# Patient Record
Sex: FEMALE | Race: OTHER
[De-identification: names, ages, dates, MRNs, and addresses within clinical notes are randomized per-mention and may not be internally consistent; named-entity substitution may affect disease eponyms.]

---

## 2017-04-06 NOTE — ER DOCUMENT REPORT
ED General





- General


Chief Complaint: OB Problem (<20wks)


Stated Complaint: BELIEVE WATER HAS BROKEN,UNSURE OF HOW FAR ALONG


Mode of Arrival: Ambulatory


Information source: Patient


Notes: 


21 yr old female  who is pregnant but unsure of how far along presents with 

ocncerns that her water broke. pt denies any vaginal bleeding. denies any pain 

at all. notes clear water drenching her bed and her clothes 


TRAVEL OUTSIDE OF THE [place] IN LAST 30 DAYS: No





- HPI


Onset: Just prior to arrival


Onset/Duration: Sudden


Quality of pain: No pain


Severity: Moderate


Pain Level: Denies


Associated symptoms: Other


Exacerbated by: Denies


Relieved by: Denies


Similar symptoms previously: No


Recently seen / treated by doctor: No





- Related Data


Allergies/Adverse Reactions: 


 





No Known Allergies Allergy (Verified 17 06:46)


 











Past Medical History





- Social History


Smoking Status: Never Smoker


Cigarette use (# per day): No


Chew tobacco use (# tins/day): No


Smoking Education Provided: No


Family History: CAD, DM, Hyperlipidemia, Hypertension, Thyroid Disfunction


Patient has suicidal ideation: No


Patient has homicidal ideation: No


Renal/ Medical History: Denies: Hx Peritoneal Dialysis





- Immunizations


Immunizations up to date: Yes


Hx Diphtheria, Pertussis, Tetanus Vaccination: Yes





Review of Systems





- Review of Systems


Notes: 


REVIEW OF SYSTEMS:


CONSTITUTIONAL :  Denies fever,  chills, or sweats.  Denies recent illness.


EENT:   Denies eye, ear, throat, or mouth pain or symptoms.  Denies nasal or 

sinus congestion or discharge.  Denies throat, tongue, or mouth swelling or 

difficulty swallowing.


CARDIOVASCULAR:  Denies chest pain.  Denies palpitations or racing or irregular 

heart beat.  Denies ankle edema.


RESPIRATORY:  Denies cough, cold, or chest congestion.  Denies shortness of 

breath, difficulty breathing, or wheezing.


GASTROINTESTINAL:  Denies abdominal pain or distention.  Denies nausea, vomiting

, or diarrhea.  Denies blood in vomitus, stools, or per rectum.  Denies black, 

tarry stools.  Denies constipation. 


GENITOURINARY:  Denies difficulty urinating, painful urination, burning, 

frequency, blood in urine, or discharge.


FEMALE  GENITOURINARY: Admits to clear vaginal discharge


MUSCULOSKELETAL:  Denies back or neck pain or stiffness.  Denies joint pain or 

swelling.


SKIN:   Denies rash, lesions or sores.


HEMATOLOGIC :   Denies easy bruising or bleeding.


LYMPHATIC:  Denies swollen, enlarged glands.


NEUROLOGICAL:  Denies confusion or altered mental status.  Denies passing out 

or loss of consciousness.  Denies dizziness or lightheadedness.  Denies 

headache.  Denies weakness or paralysis or loss of use of either side.  Denies 

problems with gait or speech.  Denies sensory loss, numbness, or tingling.  

Denies seizures.


PSYCHIATRIC:  Denies anxiety or stress.  Denies depression, suicidal ideation, 

or homicidal ideation.





ALL OTHER SYSTEMS REVIEWED AND NEGATIVE.








Dictation was performed using Dragon voice recognition software 








PHYSICAL EXAMINATION:





GENERAL: Well-appearing, well-nourished and in no acute distress.





HEAD: Atraumatic, normocephalic.





EYES: Pupils equal round and reactive to light, extraocular movements intact, 

conjunctiva are normal.





ENT: Nares patent, oropharynx clear without exudates.  Moist mucous membranes.





NECK: Normal range of motion, supple without lymphadenopathy





LUNGS: Breath sounds clear to auscultation bilaterally and equal.  No wheezes 

rales or rhonchi.





HEART: Regular rate and rhythm without murmurs





ABDOMEN: Gravid abdomen





Female : Bed and is drenched with clear liquid





Musculoskeletal: Normal range of motion, no pitting or edema.  No cyanosis.





NEUROLOGICAL: Cranial nerves grossly intact.  Normal speech, normal gait.  

Normal sensory, motor exams





PSYCH: Normal mood, normal affect.





SKIN: Warm, Dry, normal turgor, no rashes or lesions noted.





Physical Exam





- Vital signs


Vitals: 


 











Temp Pulse Resp BP Pulse Ox


 


 98.1 F   95   20   111/45 L  98 


 


 17 06:47  17 06:47  17 06:47  17 06:47  17 06:47














Course





- Re-evaluation


Re-evalutation: 





17 08:02


Patient immediately sent for ultrasound to evaluate pregnancy





- Vital Signs


Vital signs: 


 











Temp Pulse Resp BP Pulse Ox


 


 98.1 F   95   20   111/45 L  98 


 


 17 06:47  17 06:47  17 06:47  17 06:47  17 06:47














Discharge





- Discharge


Clinical Impression: 


 Pregnancy with 33 completed weeks gestation





Limited prenatal care


Qualifiers:


 Trimester: third trimester Qualified Code(s): O09.33 - Supervision of 

pregnancy with insufficient  care, third trimester





Condition: Stable


Disposition: LABOR CHECK


Additional Instructions: 


Your water has broken, you will be sent to Labor and Delivery for evaluation

## 2017-04-08 NOTE — L&D PROGRESS NOTES
=================================================================

PROGRESS NOTES

=================================================================

Datetime Report Generated by CPN: 04/08/2017 14:27

   

   

=================================================================

PROGRESS NOTE

=================================================================

   

Impression:  Premature Rupture of Membranes

Plan:  Continue Present Management

Informed Consent Obtained:  Vaginal Delivery; Risks, Benefits and

   Alternatives Discussed

Vital Signs :  Reviewed; Within Normal Limits

Comment:  CBC normal and patient w/o c/o.  Abd benign.  Plan for

   continue latency abx and expectant management until Monday.  BMZ

   complete.  Deliver for maternal/fetal indications.  Rare variables

   and reassuring FWB at this lisa.  No ctx.  Continue to monitor

   

=================================================================

MEMBRANES

=================================================================

   

Membranes:  Ruptured

Amniotic Fluid Color:  Clear

   

=================================================================

FETUS A

=================================================================

   

FHR - Baseline:  120

Monitoring:  External US

Variability:  Moderate 6-25bpm

Decelerations:  Variable

FHR Category:  Category II

:  33.3

Fetal Presentation:  Vertex

   

=================================================================

SIGNATURE

=================================================================

   

SIGNATURE:  [id number]

Signature:  Electronically signed by MD JANIE Pino) on

   4/8/2017 at 14:27  with User ID: [email or web address]

## 2017-04-10 NOTE — L&D PROGRESS NOTES
=================================================================

PROGRESS NOTES

=================================================================

Datetime Report Generated by CPN: 04/10/2017 11:27

   

   

=================================================================

PROGRESS NOTE

=================================================================

   

Impression:  Normal Progression of Labor; Reassuring Fetal Heart Rate

Procedures:  Sterile Vag Exam

Plan:  Continue Present Management; Induction

Informed Consent Obtained:  Vaginal Delivery

Vital Signs :  Reviewed

Comment:  Pt uncomfortable, would like epidural

   SVE as above

   Pt may have epdiural

   anticiapte 

   

=================================================================

VAGINAL EXAM

=================================================================

   

Dilatation:  3

Effacement:  80

Station:  -3

Contractions:  2-3

   

=================================================================

MEMBRANES

=================================================================

   

Membranes:  Ruptured

Amniotic Fluid Color:  Clear

   

=================================================================

FETUS A

=================================================================

   

FHR - Baseline:  145

Monitoring:  External US

Variability:  Moderate 6-25bpm

Accelerations:  15X15

Decelerations:  None

FHR Category:  Category I

   

=================================================================

FETUS C

=================================================================

   

SIGNATURE:  [id number]

Assignment:  Mercedes Wilson MD

Signature:  Electronically signed by Justine Martin CNM on 4/10/2017 at

   11:26  with User ID: [email or web address]

:  Electronically signed by Justine Martin CNM on 4/10/2017 at 11:26 

   with User ID: [email or web address]

## 2017-04-10 NOTE — ADMISSION PHYSICAL
=================================================================



=================================================================

Datetime Report Generated by CPN: 04/10/2017 15:11

   

   

=================================================================

CURRENT ADMISSION

=================================================================

   

Prenatal Hx Assessment:  No Prenatal Care

Chief Complaint:  Suspected Ruptured Membranes

Indication for Induction:  Not Applicable

Admit Plan:  Admit to Unit; Initiate  Labor Protocol

   

=================================================================

ALLERGIES

=================================================================

   

Medication Allergies:  No

Medication Allergies:  No Known Allergies (2017)

Medication Allergies:  No Known Allergies (2017)

Latex:  No Latex Allergies

   

=================================================================

OBSTETRICAL HISTORY

=================================================================

   

EDC:  2017 00:00

:  4

Para:  3

Gestational Diabetes:  No

Rh Sensitization:  No

Incompetent Cervix:  No

PB:  No

Infertility:  No

ART Treatment:  No

Uterine Anomaly:  No

IUGR:  No

Hx Previous C/S:  No

Macrosomia:  No

Hx Loss/Stillborn:  No

PIH:  No

Hx  Death:  No

Placenta Previa/Abruption:  No

Depression/PP Depression:  No

PTL/PROM:  Yes

Post Partum Hemorrhage:  No

Current Pregnancy Procedures:  None

Obstetrical History Comments:  G1- ,Baby boy, 6 lb 9 oz, 

   G2- ,Baby girl, 7lb 10 oz, 

   G3-  Baby girl,8lbs, , received cytotec 600mcg MS

   G4- current pregnancy, no prenatal care, PPPROM

   

=================================================================

***SEE PRENATAL RECORDS***

=================================================================

   

Alcohol:  No

Marijuana :  No

Cocaine:  No

Other Illicit Drugs:  No

Cigarettes:  Never Smoker. [id number]

   

=================================================================

MEDICAL HISTORY

=================================================================

   

Diabetes:  No

Blood Transfusion:  No

Pulmonary Disease (Asthma, TB):  No

Breast Disease:  No

Hypertension:  No

Gyn Surgery:  No

Heart Disease:  No

Hosp/Surgery:  Yes

Autoimmune Disorder:  No

Anesthetic Complications:  No

Kidney Disease:  No

Abnormal Pap Smear:  No

Neuro/Epilepsy:  No

Psychiatric Disorders:  No

Other Medical Diseases:  No

Hepatitis/Liver Disease:  No

Significant Family History:  No

Varicosities/Phlebitis:  No

Trauma/Violence :  No

Thyroid Dysfunction:  No

Medical History Comments:  childbirth x3, closed spaced pregnancies 

   

=================================================================

INFECTIOUS HISTORY

=================================================================

   

Gonorrhea:  No

Genital Herpes:  No

Chlamydia:  Yes

Tuberculosis:  No

Syphilis:  No

Hepatitis:  No

HIV/AIDS Exposure:  No

Rash or Viral Illness:  No

HPV:  No

Infectious History Comments:  chlamydia 

   

=================================================================

PHYSICAL EXAM

=================================================================

   

General:  Normal

HEENT:  Normal

Neurologic:  Normal

Thyroid:  Deferred

Heart:  Normal

Lungs:  Normal

Breast:  Normal

Back:  Normal

Abdomen:  Normal

Genitourinary Exam:  Normal

Extremities:  Normal

DTRs:  Normal

Pelvic Type:  Adequate

Physical Exam Comments:  cervical exam deferred at this time 

Vital Signs:  Reviewed; Within Normal Limits

   

=================================================================

VAGINAL EXAM

=================================================================

   

Dilatation:  3

Effacement:  80

Station:  -3

Contraction Comments:  2-3

Contraction Comments:  2-4

   

=================================================================

MEMBRANES

=================================================================

   

Membranes:  Ruptured

Membranes:  Ruptured

Amniotic Fluid Color:  Clear

Amniotic Fluid Color:  Clear

   

=================================================================

FETUS A

=================================================================

   

Monitoring:  External US

FHR- Baseline:  125

Variability:  Moderate 6-25bpm

Accelerations:  15X15

Decelerations:  None

FHR Category:  Category I

Fetal Presentation:  Vertex

Admit Comment:  19yo  O positive with no prenatal care this

   pregnancy presented to ER this am with suspected PROM. Evaluated in

   the ER and found to be 33w3d by u/s. EFW 4lbs 7oz in vertex

   presentation, anterior placenta and ADALBERTO 1.5cm. Hx of chlamydia in

    with first pregnancy. Pt. reports she found out she was

   pregnant in January but had not seeked prenatal care because she is

   moving to [place]. Reports large amount of clear fluid at 0630 this

   am. Pelvis proven to 7lbs 9oz. Hx of all vaginal deliveries at term

   no complications. Denies hx or current use of alcohol or drugs

   during or prior to finding out about pregnancy. Plan is to obtain

   prenatal care labs, GC/Chlam _ GBS cultures, first dose of

   betamethasone after blood draw, repeat in 24hrs if still pregnant.

   Ampicillin 2g q6h, 500mg zithromax qd, observation on L_D at this

   time and will try to get two doses of steroids in prior to IOL.

   Reassessed as clinically indicated. Considering adoption at this

   time. Dr. Agustin aware and in agreement with poc.

   

=================================================================

PLANS FOR LABOR AND DELIVERY

=================================================================

   

Labor and Delivery:  None

Pain Management:  Epidural

Feeding Preference:  Formula

Benefit of Breast Feed Discussed:  Yes

Circumcision:  unknown sex 

   

=================================================================

INFORMED CONSENT

=================================================================

   

Informed Consent Obtained:  Vaginal Delivery

Informed Consent Obtained:  Vaginal Delivery

Informed Consent Obtained:  Vaginal Delivery; Risks, Benefits and

   Alternatives Discussed

Assignment:  Danisha Agustin MD

Signature:  Electronically signed by Lucie Tirado CNM on 2017

   at 09:16  with User ID: [email or web address]

:  Electronically signed by Lucie Tirado CNM on 2017 at 09:16

   with User ID: [email or web address]

## 2017-04-10 NOTE — L&D PROGRESS NOTES
=================================================================

PROGRESS NOTES

=================================================================

Datetime Report Generated by CPN: 04/10/2017 09:49

   

   

=================================================================

PROGRESS NOTE

=================================================================

   

Impression:  Reassuring Fetal Heart Rate

Procedures:  Sterile Vag Exam

Procedures- Other:  per rn

Plan:  Induction

Informed Consent Obtained:  Vaginal Delivery

Vital Signs :  Reviewed

Comment:  Continues to leak clear fluid, states active baby, denies

   bleeding, fever, chills, or abdominal pain. 

   34 weeks today

   IOL, ppprom on 04/06/17 @ 0600, clear

   has been on azithromycin and ampicillin

   GBS negative

   Pt desires depo before discharge. 

   

=================================================================

VAGINAL EXAM

=================================================================

   

Contractions:  2-4

   

=================================================================

FETUS A

=================================================================

   

FHR - Baseline:  140

Monitoring:  External US

Variability:  Moderate 6-25bpm

Accelerations:  15X15

Decelerations:  None

FHR Category:  Category I

   

=================================================================

FETUS C

=================================================================

   

SIGNATURE:  [id number]

Assignment:  Mercedes Wilson MD

Signature:  Electronically signed by Justine Martin CNM on 4/10/2017 at

   09:48  with User ID: [email or web address]

:  Electronically signed by Justine Martin CNM on 4/10/2017 at 09:48 

   with User ID: [email or web address]

## 2017-04-10 NOTE — L&D DISCHARGE SUMMARY
=================================================================

OB Discharge Summary

=================================================================

Datetime Report Generated by CPN: 04/10/2017 18:10

   

   

=================================================================

DISCHARGE DIAGNOSIS

=================================================================

   

Gestation:  34.0

Number of Babies in Womb:  1

Parity:  3

## 2017-04-11 NOTE — PDOC PROGRESS REPORT
Subjective-OB


Subjective: 


Post Delivery Day:








21 year old.  Denies any needs at this time 


Doing well, no c/o, wants to go home, baby in NICU, scant lochia, eating well





Physical Exam (OB)


Vital Signs: 


 











Temp Pulse Resp BP Pulse Ox


 


 98.8 F   104 H  18   115/64   100 


 


 04/10/17 19:30  04/10/17 19:30  04/10/17 19:30  04/10/17 19:30  04/10/17 15:13














- PIH/Pre-Eclampsia


Clonus: Negative





- Lochia


Lochia Amount: Small 10-25 ml


Lochia Color: Rubra/Red





- Abdomen


Description: Soft


Hernia Present: No


Fundal Description: Firm, Midline


Fundal Height: u/u - u/2





Objective-Diagnostic


Laboratory: 


 





 17 06:45 





 











  17





  06:45


 


WBC  8.0


 


RBC  4.50


 


Hgb  10.8 L


 


Hct  33.5 L


 


MCV  75 L


 


MCH  24.1 L


 


MCHC  32.4


 


RDW  15.7 H


 


Plt Count  282














Assessment and Plan(PN)





- Assessment and Plan


(1) Delivery normal


Is this a current diagnosis for this admission?: Yes





(2) Anemia


Qualifiers: 


     Anemia type: iron deficiency


Is this a current diagnosis for this admission?: Yes





(3) Limited prenatal care


Qualifiers: 


     Trimester: unspecified trimester        Qualified Code(s): O09.30 - 

Supervision of pregnancy with insufficient  care, unspecified 

trimester  


Is this a current diagnosis for this admission?: Yes





(4) Pregnancy with 33 completed weeks gestation


Is this a current diagnosis for this admission?: Yes








- Time Spent with Patient


Time with patient: Less than 15 minutes


Smoking Education Provided: Over 3 minutes


Medications reviewed and adjusted accordingly: Yes





- Disposition


Anticipated Discharge: Home


Within: within 24 hours

## 2017-04-12 NOTE — L&D ADMISSION ASSESSMENT
=================================================================

LD ADM ASMT

=================================================================

Datetime Report Generated by CPN: 04/12/2017 20:20

   

Weight (lb):  165    (04/12/2017 10:46:QS system process)

Weight (lb):  165    (04/12/2017 10:28:QS system process)

Weight (lb):  165    (04/11/2017 14:36:QS system process)

Weight (lb):  165    (04/10/2017 15:26:QS system process)

Weight (lb):  165    (04/10/2017 15:10:QS system process)

Weight (lb):  165    (04/10/2017 10:42:QS system process)

Weight (lb):  165    (04/10/2017 09:15:QS system process)

Weight (lb):  165    (04/08/2017 05:48:QS system process)

Weight (lb):  165    (04/07/2017 09:50:QS system process)

Weight (kg):  75.0    (04/12/2017 10:46:QS system process)

Weight (kg):  75.0    (04/12/2017 10:28:QS system process)

Weight (kg):  75.0    (04/11/2017 14:36:QS system process)

Weight (kg):  75.0    (04/10/2017 15:26:QS system process)

Weight (kg):  75.0    (04/10/2017 15:10:QS system process)

Weight (kg):  75.0    (04/10/2017 10:42:QS system process)

Weight (kg):  75.0    (04/10/2017 09:15:QS system process)

Weight (kg):  75.0    (04/08/2017 05:48:QS system process)

Weight (kg):  75.0    (04/07/2017 09:50:QS system process)

BMI:  33.3    (04/12/2017 10:46:QS system process)

BMI:  33.3    (04/12/2017 10:28:QS system process)

BMI:  33.3    (04/11/2017 14:36:QS system process)

BMI:  33.3    (04/10/2017 15:26:QS system process)

BMI:  33.3    (04/10/2017 15:10:QS system process)

BMI:  33.3    (04/10/2017 10:42:QS system process)

BMI:  33.3    (04/10/2017 09:15:QS system process)

BMI:  33.3    (04/08/2017 05:48:QS system process)

BMI:  33.3    (04/07/2017 09:50:QS system process)

Total Toribio's Score:  5    (04/10/2017 08:14:QS system process)

Toribio's Score Text:  5-8 = Small percentage of induction failure   

   (04/10/2017 08:14:QS system process)

DVT Risk Total:  0    (04/10/2017 07:24:QS system process)

DVT Risk Total:  1    (04/09/2017 09:45:QS system process)

DVT Risk Total:  2    (04/08/2017 19:35:QS system process)

DVT Risk Total:  1    (04/08/2017 10:00:QS system process)

DVT Risk Total:  2    (04/07/2017 20:00:QS system process)

DVT Risk Total:  1    (04/07/2017 07:59:QS system process)

DVT Risk Text:  Low Risk (<10%) No specific measures, early ambulation 

   (04/10/2017 07:24:QS system process)

DVT Risk Text:  Low Risk (<10%) No specific measures, early ambulation 

   (04/09/2017 09:45:QS system process)

DVT Risk Text:  Moderate Risk (10-20%) - Consider stockings,

   compresssion device, pharmacological therapy per hospital policy   

   (04/08/2017 19:35:QS system process)

DVT Risk Text:  Low Risk (<10%) No specific measures, early ambulation 

   (04/08/2017 10:00:QS system process)

DVT Risk Text:  Moderate Risk (10-20%) - Consider stockings,

   compresssion device, pharmacological therapy per hospital policy   

   (04/07/2017 20:00:QS system process)

DVT Risk Text:  Low Risk (<10%) No specific measures, early ambulation 

   (04/07/2017 07:59:QS system process)

Viktor Scale Total:  23    (04/10/2017 07:24:QS system process)

Viktor Scale Total:  23    (04/09/2017 19:28:QS system process)

Viktor Scale Total:  21    (04/09/2017 09:45:QS system process)

Viktor Scale Total:  22    (04/08/2017 19:35:QS system process)

Viktor Scale Total:  22    (04/08/2017 10:00:QS system process)

Viktor Scale Total:  23    (04/07/2017 20:00:QS system process)

Viktor Scale Total:  21    (04/07/2017 07:59:QS system process)

Viktor Scale Risk:  No Risk of Pressure Ulcer Noted at this Time   

   (04/10/2017 07:24:QS system process)

Viktor Scale Risk:  No Risk of Pressure Ulcer Noted at this Time   

   (04/09/2017 19:28:QS system process)

Viktor Scale Risk:  No Risk of Pressure Ulcer Noted at this Time   

   (04/09/2017 09:45:QS system process)

Viktor Scale Risk:  No Risk of Pressure Ulcer Noted at this Time   

   (04/08/2017 19:35:QS system process)

Viktor Scale Risk:  No Risk of Pressure Ulcer Noted at this Time   

   (04/08/2017 10:00:QS system process)

Viktor Scale Risk:  No Risk of Pressure Ulcer Noted at this Time   

   (04/07/2017 20:00:QS system process)

Viktor Scale Risk:  No Risk of Pressure Ulcer Noted at this Time   

   (04/07/2017 07:59:QS system process)

Fall Risk Score:  20    (04/10/2017 07:24:QS system process)

Fall Risk Score:  0    (04/09/2017 19:28:QS system process)

Fall Risk Score:  20    (04/09/2017 09:45:QS system process)

Fall Risk Score:  0    (04/08/2017 19:35:QS system process)

Fall Risk Score:  20    (04/07/2017 20:00:QS system process)

Fall Risk Score:  0    (04/07/2017 07:59:QS system process)

Fall Risk Score Definition:  No Risk: No action required    (04/10/2017

   07:24:QS system process)

Fall Risk Score Definition:  No Risk: No action required    (04/09/2017

   19:28:QS system process)

Fall Risk Score Definition:  No Risk: No action required    (04/09/2017

   09:45:QS system process)

Fall Risk Score Definition:  No Risk: No action required    (04/08/2017

   19:35:QS system process)

Fall Risk Score Definition:  No Risk: No action required    (04/07/2017

   20:00:QS system process)

Fall Risk Score Definition:  No Risk: No action required    (04/07/2017

   07:59:QS system process)

## 2017-04-12 NOTE — DELIVERY SUMMARY
=================================================================

Del Sum A-C

=================================================================

Datetime Report Generated by CPN: 2017 20:20

   

   

=================================================================

DELIVERY PERSONNEL

=================================================================

   

DELIVERY PERSONNEL:  [phone]

Delivery Doctor::  Justine Martin CNM

Labor and Delivery Nurse::  Kenisha Lipscomb RN

Labor and Delivery Nurse::  MAKEDA Quintana

Nursery Nurse::  Danisha South RN

Nursery Nurse::  Zonia Braswell RN

Scrub Tech/CNA:  Danisha Mancilla, CST

Scrub Tech/CNA:  Shankar Retana, ST

   

=================================================================

MATERNAL INFORMATION

=================================================================

   

Delivery Anesthesia:  Epidural

Medications After Delivery:  Pitocin Drip 20 Units/1000ml NSS

Estimated  Blood Loss (ml):  250

Maternal Complications:  Other

Other Maternal Complications:  PPPROM

Provider Comments:   of viable female infant over intact perineum,

   head, shoulders, and body delivered without difficulty, infant with

   spontaneous cry and respirations to maternal abdomen. Cord clamped

   X2, infant cut free by pt after 1 minute delay. Spontaneous delivery

   of placenta via nichole mechanism, appears intact, 3 VC, to

   pathology. Vagina and perineum inspected, no lacerations noted.

   Hemostasis acheived with exteral fundal massage and IV pitocin,

   mother instable condition infant to nursery. 

   

=================================================================

LABOR SUMMARY

=================================================================

   

EDC:  2017 00:00

No. Babies in Womb:  1

 Attempted:  No

Labor Anesthesia:  Epidural

   

=================================================================

LABOR INFORMATION

=================================================================

   

Reason for Induction:  Premature Rupture of Membranes

Onset of Labor:  04/10/2017 11:30

Complete Dilatation:  04/10/2017 13:14

Oxytocin:  Augmentation

Group B Beta Strep:  1 NO GROUP B STREPTOCOCCUS RECOVERED

Antibiotics # of Doses:  15/5

Antibiotics Time of Last Dose:  934

Name of Antibiotic Given:  AMPICILLIN/ ZITHROMAX

Steroids Given:  > 24 Hours before Delivery

Reason Steroids Not Administered:  Not Applicable

   

=================================================================

MEMBRANES

=================================================================

   

Membranes Rupture Method:  Spontaneous

Rupture of Membranes:  2017 06:00

Length of Rupture (hr):  103.27

Amniotic Fluid Color:  Clear

Amniotic Fluid Amount:  Small

Amniotic Fluid Odor:  Normal

   

=================================================================

STAGES OF LABOR

=================================================================

   

Stage 1 hr:  1

Stage 1 min:  44

Stage 2 hr:  0

Stage 2 min:  2

Stage 3 hr:  0

Stage 3 min:  4

Total Time in Labor hr:  1

Total Time in Labor min:  50

   

=================================================================

VAGINAL DELIVERY

=================================================================

   

Episiotomy:  None

Laceration Extension:  N/A

Laceration Type:  None

Laceration Repair Note:  n/a

Sponge Count Correct:  N/A

Sharps Count Correct:  N/A

   

=================================================================

BABY A INFORMATION

=================================================================

   

Infant Delivery Date/Time:  04/10/2017 13:16

Method of Delivery:  Vaginal

Born in Route :  No

:  N/A

Forceps:  N/A

Vacuum Extraction:  N/A

Shoulder Dystocia :  No

   

=================================================================

PRESENTATION/POSITION BABY A

=================================================================

   

Presentation:  Cephalic

Cephalic Presentation:  Vertex

Breech Presentation:  N/A

   

=================================================================

PLACENTA INFORMATION BABY A

=================================================================

   

Placenta Delivery Time :  04/10/2017 13:20

Placenta Method of Delivery:  Manual Removal

Placenta Status:  Delivered

   

=================================================================

APGAR SCORES BABY A

=================================================================

   

Heart Rate 1 min:  >100 bpm

Resp Effort 1 min:  Good Cry

Reflex Irritability 1 min:  Cough or Sneeze or Pulls Away

Muscle Tone 1 min:  Active Motion

Color 1 min:  Body Pink, Extremities Blue

Resuscitation Effort 1 min:  Tactile Stimulation

APGAR SCORE 1 MIN:  9

Heart Rate 5 min:  >100 bpm

Resp Effort 5 min:  Good Cry

Reflex Irritability 5 min:  Cough or Sneeze or Pulls Away

Muscle Tone 5 min:  Active Motion

Color 5 min:  Body Pink, Extremities Blue

APGAR SCORE 5 MIN:  9

   

=================================================================

INFANT INFORMATION BABY A

=================================================================

   

Gestational Age at Delivery:  34.0

Gestational Status:  Late - 34- 36.6 Weeks

Infant Outcome :  Liveborn

Infant Condition :  Stable

Infant Sex:  Female

   

=================================================================

IDENTIFICATION BABY A

=================================================================

   

Infant Verification Date/Time:  04/10/2017 13:26

ID Band Number:  [id number]

Mother's Name Verified:  Yes

Infant Medical Record Number:  [id number]

RN Verifying Infant:  B [place] us/D Bellavance RN

   

=================================================================

WEIGHT/LENGTH BABY A

=================================================================

   

Infant Birthweight (gm):  2152

Infant Weight (lb):  4

Infant Weight (oz):  12

Infant Length (in):  17.50

Infant Length (cm):  44.45

   

=================================================================

CORD INFORMATION BABY A

=================================================================

   

No. Cord Vessels:  3

Nuchal Cord :  N/A

Cord Blood Taken:  Yes-For Eval (Mom's Blood Type - or O+)

Infant Suction:  Mouth

   

=================================================================

ASSESSMENT BABY A

=================================================================

   

Physical Findings at Delivery:  Within Normal Limits

Infant Respirations:  Appears Normal

Skin to Skin:  No

Neonatologist/ALS Called :  No

Infant Care By:  B French/A Javonmore

Transferred To:  NICU

   

=================================================================

BABY B INFORMATION

=================================================================

   

 :  N/A

   

=================================================================

SIGNATURES

=================================================================

   

Assignment:  Mercedes Wilson MD

Signature:  Electronically signed by Justine Martin CNM on 4/10/2017 at

   14:02  with User ID: [email or web address]

:  Electronically signed by Justine Martin CNM on 4/10/2017 at 14:02 

   with User ID: [email or web address]

## 2017-04-12 NOTE — L&D CURRENT ADMISSION
=================================================================

Current Admit

=================================================================

Datetime Report Generated by CPN: 04/12/2017 20:20

   

   

=================================================================

ADMISSION INFORMATION

=================================================================

   

Current Admit Date/Time:  04/06/2017 08:52    (04/06/2017 08:25:Kenisha Lipscomb RN)

Reason for Admission:  Rupture of Membranes    (04/06/2017

   08:25:Kenisha Lipscomb RN)

Chief Complaint:  rapture of membranes    (04/06/2017 19:07:Bria Roldan RN)

EGA per Dates:  33.3    (04/06/2017 08:50:QS system process)

Method of Arrival:  Wheelchair    (04/06/2017 08:25:Kenisha Lipscomb RN)

Admitted From:  Antepartum Unit    (04/06/2017 08:25:Kenisha Lipscomb RN)

Reason for Induction:  Not Applicable    (04/06/2017 08:25:Kenisha Lipscomb RN)

Prenatal Records Available:  No    (04/06/2017 08:25:Kenisha Lipscomb RN)

General Admission Information:  Updated    (04/06/2017 08:25:Kenisha Lipscomb RN)

   

=================================================================

BELONGINGS/ADVANCED DIRECTIVES

=================================================================

   

Valuables/Personal Effects:  Cell Phone    (04/06/2017 08:25:Kenisha Lipscomb RN)

Other Belongings:  see valuable consent     (04/06/2017 08:25:[place] Bowens RN)

Disposition of Belongings:  Kept with Patient    (04/06/2017

   08:25:Kenisha Lipscomb RN)

Advance Direct for Healthcare:  No, but Requests Information   

   (04/06/2017 08:25:Kenisha Lipscomb RN)

Durable Power of :  No    (04/06/2017 08:25:Kenisha Lipscomb RN)

Living Will:  No    (04/06/2017 08:25:Kenisha Lipscomb RN)

Organ Donor:  Yes    (04/06/2017 08:25:Kenisha Lipscomb RN)

Pt Rights Information Given:  Yes    (04/06/2017 08:25:Kenisha Lipscomb RN)

Pt Understands Pt Rights:  Yes    (04/06/2017 08:25:Kenisha Lipscomb RN)

   

=================================================================

LEARNING ASSESSMENT

=================================================================

   

Knowledge Level:  Understands L_D Process; Understands Care Activities;

   Had Pre-Hospital Education; Understands Diagnosis    (04/06/2017

   08:25:Kenisha Lipscomb RN)

Barriers to Learning:  None    (04/06/2017 08:25:Kenisha Lipscomb RN)

Learning Readiness:  Motivated    (04/06/2017 08:25:Kenisha Lipscomb RN)

Learns Best By:  1 to 1 Instruction    (04/06/2017 08:25:Kenisha Lipscomb RN)

Learning Needs:  Labor and Delivery Process; Pain Management; Symptoms

   to Report; Treatment Plan; Medication; Diagnosis; Nutrition;

   Equipment; Infant Care; Community Resources    (04/06/2017

   08:25:Kenisha Lipscomb RN)

   

=================================================================

DOMESTIC VIOLANCE SCREENING

=================================================================

   

Dom Viol Threatened/Hurt:  No    (04/06/2017 08:25:Kenisha Lipscomb RN)

Hx of Abuse/Neglect past 2yrs:  No    (04/06/2017 08:25:Kenisha Lipscomb RN)

Feel Unsafe Going Home:  No    (04/06/2017 08:25:Kenisha Lipscomb RN)

Addt'l Observ Indicating Abuse:  No    (04/06/2017 08:25:Kenisha Lipscomb RN)

Reason Unable to Complete Screen:  N/A, Screen Completed    (04/06/2017

   08:25:Kenisha Lipscomb RN)

Considered Personal Harm/Suicide:  No    (04/06/2017 08:25:Kenisha Lipscomb RN)

   

=================================================================

NUTRITIONAL/FUNCTIONAL SCREENING

=================================================================

   

Problem with Appetite >5 Days:  No    (04/06/2017 08:25:Kenisha Lipscomb RN)

Chew/Swallow Difficulties:  No    (04/06/2017 08:25:Kenisha Lipscomb RN)

Inappropriate Wt Gain/Loss:  No    (04/06/2017 08:25:Kenisha Lipscomb RN)

Presence Skin Breakdown/Ulcer:  No    (04/06/2017 08:25:Kenisha Lipscomb RN)

Special Diet:  No    (04/06/2017 08:25:Kenisha Lipscomb RN)

Pt Requests Dietician Visit:  No    (04/06/2017 08:25:Kenisha Lipscomb RN)

Hx of Any of the Following?:  N/A    (04/06/2017 08:25:Kenisha Lipscomb RN)

New Diagnosis of:  N/A    (04/06/2017 08:25:Kenisha Lipscomb RN)

Requires Assist w/Ambulation:  No    (04/06/2017 08:25:Kenisha Lipscomb RN)

Uses Assist Device to Ambulate:  No    (04/06/2017 08:25:Kenisha Lipscomb RN)

Pt Requires Help w/ADL's:  No    (04/06/2017 08:25:Kenisha Lipscomb RN)

## 2017-04-12 NOTE — PDOC DISCHARGE SUMMARY
Final Diagnosis


Discharge Date: 04/12/17





- Final Diagnosis


(1) Anemia


Is this a current diagnosis for this admission?: Yes





(2) Delivery normal


Is this a current diagnosis for this admission?: Yes





(3) Limited prenatal care


Is this a current diagnosis for this admission?: Yes





(4) Pregnancy


Is this a current diagnosis for this admission?: Yes








Discharge Data





- Discharge Medication


Home Medications: 








No Home Medications  04/06/17 








Reason(s) for Admission: Onset of Labor


Prenatal Procedures: None


Intrapartum Procedure(s): Spontaneous Vaginal Delivery





- Diagnosis Test


Laboratory: 


 











Temp Pulse Resp BP Pulse Ox


 


 97.4 F   75   16   100/56 L  99 


 


 04/12/17 08:33  04/12/17 08:33  04/12/17 08:33  04/12/17 08:33  04/12/17 08:33








 











  04/06/17 04/07/17 04/08/17





  09:26 07:22 07:07


 


RBC  4.04  3.88  3.79


 


Hgb  9.8 L  9.4 L  9.2 L


 


Hct  30.2 L  28.9 L  28.2 L














  04/11/17





  06:45


 


RBC  4.50


 


Hgb  10.8 L


 


Hct  33.5 L














- Discharge information/Instructions


Discharge Activity: Activity As Tolerated, No Lifting Over 10 Pounds, Pelvic 

Rest, No tub bath


Discharge Diet: Regular


Disposition: HOME, SELF-CARE


Follow up with: [facility]


in: 4 - limited prenatal care reviewed precautions follow up in clinic

## 2017-04-12 NOTE — DELIVERY SUMMARY
=================================================================

Del Sum A-C

=================================================================

Datetime Report Generated by CPN: 2017 20:20

   

   

=================================================================

DELIVERY PERSONNEL

=================================================================

   

DELIVERY PERSONNEL:  [phone]

Delivery Doctor::  Justine Martin CNM

Labor and Delivery Nurse::  Kenisha Lipscomb RN

Labor and Delivery Nurse::  MAKEDA Quintana

Nursery Nurse::  Danisha South RN

Nursery Nurse::  Zonia Braswell RN

Scrub Tech/CNA:  Danisha Mancilla, CST

Scrub Tech/CNA:  Shankar Retana, ST

   

=================================================================

MATERNAL INFORMATION

=================================================================

   

Delivery Anesthesia:  Epidural

Medications After Delivery:  Pitocin Drip 20 Units/1000ml NSS

Estimated  Blood Loss (ml):  250

Maternal Complications:  Other

Other Maternal Complications:  PPPROM

Provider Comments:   of viable female infant over intact perineum,

   head, shoulders, and body delivered without difficulty, infant with

   spontaneous cry and respirations to maternal abdomen. Cord clamped

   X2, infant cut free by pt after 1 minute delay. Spontaneous delivery

   of placenta via nichole mechanism, appears intact, 3 VC, to

   pathology. Vagina and perineum inspected, no lacerations noted.

   Hemostasis acheived with exteral fundal massage and IV pitocin,

   mother instable condition infant to nursery. 

   

=================================================================

LABOR SUMMARY

=================================================================

   

EDC:  2017 00:00

No. Babies in Womb:  1

 Attempted:  No

Labor Anesthesia:  Epidural

   

=================================================================

LABOR INFORMATION

=================================================================

   

Reason for Induction:  Premature Rupture of Membranes

Onset of Labor:  04/10/2017 11:30

Complete Dilatation:  04/10/2017 13:14

Oxytocin:  Augmentation

Group B Beta Strep:  1 NO GROUP B STREPTOCOCCUS RECOVERED

Antibiotics # of Doses:  15/5

Antibiotics Time of Last Dose:  934

Name of Antibiotic Given:  AMPICILLIN/ ZITHROMAX

Steroids Given:  > 24 Hours before Delivery

Reason Steroids Not Administered:  Not Applicable

   

=================================================================

MEMBRANES

=================================================================

   

Membranes Rupture Method:  Spontaneous

Rupture of Membranes:  2017 06:00

Length of Rupture (hr):  103.27

Amniotic Fluid Color:  Clear

Amniotic Fluid Amount:  Small

Amniotic Fluid Odor:  Normal

   

=================================================================

STAGES OF LABOR

=================================================================

   

Stage 1 hr:  1

Stage 1 min:  44

Stage 2 hr:  0

Stage 2 min:  2

Stage 3 hr:  0

Stage 3 min:  4

Total Time in Labor hr:  1

Total Time in Labor min:  50

   

=================================================================

VAGINAL DELIVERY

=================================================================

   

Episiotomy:  None

Laceration Extension:  N/A

Laceration Type:  None

Laceration Repair Note:  n/a

Sponge Count Correct:  N/A

Sharps Count Correct:  N/A

   

=================================================================

BABY A INFORMATION

=================================================================

   

Infant Delivery Date/Time:  04/10/2017 13:16

Method of Delivery:  Vaginal

Born in Route :  No

:  N/A

Forceps:  N/A

Vacuum Extraction:  N/A

Shoulder Dystocia :  No

   

=================================================================

PRESENTATION/POSITION BABY A

=================================================================

   

Presentation:  Cephalic

Cephalic Presentation:  Vertex

Breech Presentation:  N/A

   

=================================================================

PLACENTA INFORMATION BABY A

=================================================================

   

Placenta Delivery Time :  04/10/2017 13:20

Placenta Method of Delivery:  Manual Removal

Placenta Status:  Delivered

   

=================================================================

APGAR SCORES BABY A

=================================================================

   

Heart Rate 1 min:  >100 bpm

Resp Effort 1 min:  Good Cry

Reflex Irritability 1 min:  Cough or Sneeze or Pulls Away

Muscle Tone 1 min:  Active Motion

Color 1 min:  Body Pink, Extremities Blue

Resuscitation Effort 1 min:  Tactile Stimulation

APGAR SCORE 1 MIN:  9

Heart Rate 5 min:  >100 bpm

Resp Effort 5 min:  Good Cry

Reflex Irritability 5 min:  Cough or Sneeze or Pulls Away

Muscle Tone 5 min:  Active Motion

Color 5 min:  Body Pink, Extremities Blue

APGAR SCORE 5 MIN:  9

   

=================================================================

INFANT INFORMATION BABY A

=================================================================

   

Gestational Age at Delivery:  34.0

Gestational Status:  Late - 34- 36.6 Weeks

Infant Outcome :  Liveborn

Infant Condition :  Stable

Infant Sex:  Female

   

=================================================================

IDENTIFICATION BABY A

=================================================================

   

Infant Verification Date/Time:  04/10/2017 13:26

ID Band Number:  [id number]

Mother's Name Verified:  Yes

Infant Medical Record Number:  [id number]

RN Verifying Infant:  B Dillahunt us/D Bellavance RN

   

=================================================================

WEIGHT/LENGTH BABY A

=================================================================

   

Infant Birthweight (gm):  2152

Infant Weight (lb):  4

Infant Weight (oz):  12

Infant Length (in):  17.50

Infant Length (cm):  44.45

   

=================================================================

CORD INFORMATION BABY A

=================================================================

   

No. Cord Vessels:  3

Nuchal Cord :  N/A

Cord Blood Taken:  Yes-For Eval (Mom's Blood Type - or O+)

Infant Suction:  Mouth

   

=================================================================

ASSESSMENT BABY A

=================================================================

   

Physical Findings at Delivery:  Within Normal Limits

Infant Respirations:  Appears Normal

Skin to Skin:  No

Neonatologist/ALS Called :  No

Infant Care By:  B French/A Javonmore

Transferred To:  NICU

   

=================================================================

BABY B INFORMATION

=================================================================

   

 :  N/A

   

=================================================================

SIGNATURES

=================================================================

   

Assignment:  Mercedes Wilson MD

Signature:  Electronically signed by Justine Martin CNM on 4/10/2017 at

   14:02  with User ID: [email or web address]

:  Electronically signed by Justine Martin CNM on 4/10/2017 at 14:02 

   with User ID: [email or web address]

## 2017-04-12 NOTE — L&D GENERAL ADMISSION
=================================================================

General Admit

=================================================================

Datetime Report Generated by CPN: 2017 18:00

   

   

=================================================================

PREGNANCY INFORMATION

=================================================================

   

Patient Age:  21    (2017 08:38:QS system process)

EDC:  2017 00:00    (2017 08:50:Carlyn Joshua)

:  4    (2017 08:50:Crystal Otwell, RN)

Para:  3    (2017 08:50:Crystal Otwell, RN)

Baby, Number in Womb:  1    (2017 08:50:Crystal Deisi, RN)

   

=================================================================

PRENATAL CARE

=================================================================

   

Adequate Prenatal Care:  No    (2017 08:50:Crystal Deisi,

   RN)

Height (in):  59    (2017 10:46:QS system process)

   

=================================================================

ALLERGIES

=================================================================

   

Medication Allergy:  No    (2017 08:50:Crystal Deisi, RN)

Medication Allergies:  No Known Allergies (2017)    (2017

   05:48:QS system process)

Latex Allergy:  No Latex Allergies    (2017 08:50:Crystal

   Otwell, RN)

   

=================================================================

COMMUNICATION

=================================================================

   

Primary Language:  English    (2017 08:50:Crystal Otwell, RN)

Medical Tx Preferred Language:  English    (2017 08:50:Crystal

   Deisi, RN)

   

=================================================================

DEMOGRAPHICS

=================================================================

   

Address:  [address] [place] [address]   [id number]    (2017 08:38:QS system process)

Zipcode:  [address]    (2017 08:38:QS system process)

Home Telephone:  [phone]    (2017 08:38:QS system process)

SSN:      (2017 08:38:QS system process)

Next of Kin Name:  CATHERINECONRAD RICH    (2017 08:38:QS system

   process)

Next of Kin Phone:  [phone]    (2017 08:38:QS system

   process)

Next of Kin Relationship:  FA    (2017 08:38:QS system process)

YOB: 1995    (2017 08:38:QS system process)

Marital Status:  Single    (2017 08:38:QS system process)

Sex:  Female    (2017 08:38:QS system process)

Race:  Other    (2017 08:38:QS system process)

Ethnicity:   or     (2017 08:38:QS system process)

Episcopalian:  Jainism    (2017 08:38:QS system process)

   

=================================================================

DRUG AND ALCOHOL USE

=================================================================

   

Alcohol:  No    (2017 08:50:Crystal Otwell, RN)

Cigarettes:  Never Smoker. [id number]    (2017 08:50:Kenisha Lipscomb RN)

Marijuana:  No    (2017 08:50:Kenisha Lipscomb RN)

Cocaine:  No    (2017 08:50:Kenisha Lipscomb RN)

Other Illicit Drugs:  No    (2017 08:50:Kenisha Lipscomb RN)

   

=================================================================

VACCINE HISTORY

=================================================================

   

Influenza Vaccine:  No    (2017 08:50:Kenisha Lipscomb RN)

Pneumococcal Vaccine:  No    (2017 08:50:Kenisha Lipscomb RN)

Tetanus Vaccine:  No    (2017 08:50:Kenisha Lipscomb RN)

Tdap Vaccine:  No    (2017 08:50:Kenisha Lipscomb RN)

Hepatitis B Vaccine:  No    (2017 08:50:Kenisha Lipscomb RN)

   

=================================================================



=================================================================

   

Pediatrician:  [facility]    (2017 08:50:Kenisha Lipscomb RN)

Feeding Preference:  Formula    (2017 08:50:Kenisha Lipscomb RN)

Benefit of Breast Feed Discussed:  Yes    (2017 08:50:Kenisha Lipscomb RN)

Circumcision:  unknown sex     (2017 08:50:[place] Bowens RN)

Prenatal Classes Attended:  No    (2017 08:50:[place] Bowens RN)

Tubal Ligation:  No    (2017 08:50:Kenisha Lipscomb RN)

Tubal Authorization Signed:  N/A    (2017 08:50:Kenisha Lipscomb RN)

 Consent:  N/A    (2017 08:50:Kenisha Lipscomb RN)

 Consent Signed:  N/A    (2017 08:50:Kenisha Lipscomb RN)

Pain Management Plans:  Epidural    (2017 08:50:Kenisha Lipscomb RN)

Plans for Labor and Delivery:  None    (2017 08:50:Kenisha Lipscomb RN)

Cultural/Spritual Practice:  No    (2017 08:50:Kenisha Lipscomb RN)

Spir/Cult Dietary Needs:  No    (2017 08:50:Kenisha Lipscomb RN)

   

=================================================================

LIVING SITUATION/DISCHARGE PLAN

=================================================================

   

Living Arrangements:  House    (2017 08:50:Kenisha Lipscomb RN)

Adequate Access to::  Electric; Heat; Refrigeration; Plumbing/Running

   water; Phone; Transportation    (2017 08:50:Kenisha Lipscomb RN)

WIC Program:  No    (2017 08:50:Kenisha Lipscomb RN)

Discharge  Person:  patient unsure    (2017 08:50:[place] Bowens RN)

Person to Help after Discharge:  patient unsure    (2017

   08:50:[place] Bowens RN)

Currently Using Commun Resources:  No    (2017 08:50:Kenisha Lipscomb RN)

Outside Agency/:  No    (2017 08:50:Kenisha Lipscomb RN)

Car Seat for Discharge:  Yes    (2017 08:50:Kenisha Lipscomb RN)

Adoption Requested:  Yes    (2017 08:50:Kenisha Lipscomb RN)

Agency/Agent Handling Adoption:  discharge planning has patient in

   touch with [employer] and patient has paperwork at bedside

   to review     (2017 08:50:Florida Bowens RN)

Pt Contact w/infant Post Birth:  N/A    (2017 08:50:Kenisha Lipscomb RN)

   

=================================================================

PRENATAL LABS

=================================================================

   

Blood Type:  O Positive    (2017 08:50:Kenisha Lipscomb RN)

Antibody Screen:  Negative    (2017 08:50:Kenisha Lipscomb RN)

Hemoglobin:  10.8 L    (2017 06:45:QS system process)

Hematocrit:  33.5 L    (2017 06:45:QS system process)

MCV:  75 L    (2017 06:45:QS system process)

Group Beta Strep:  1 NO GROUP B STREPTOCOCCUS RECOVERED    (2017

   09:20:QS system process)

Gonorrhea:  Negative    (2017 08:50:Kenisha Lipscomb RN)

Chlamydia:  Negative    (2017 08:50:Kenisha Lipscomb RN)

RPR/VDRL:  Nonreactive    (2017 08:50:Florida Bowens RN)

HIV Results:  NEGATIVE    (2017 09:26:QS system process)

Hepatitis B:  Negative    (2017 08:50:Florida Bowens RN)

Rubella:  POSITIVE

   NEGATIVE IF LESS THAN OR EQUAL TO 9.99 IU/mL

   POSITIVE IF GREATER THAN OR EQUAL TO 10.0 IU/mL    (2017

   09:26:QS system process)

Rubella Titer:  19.00    (2017 09:26:QS system process)

   

=================================================================

OB/PREVIOUS PREGNANCY HISTORY

=================================================================

   

Previous Pregnancy Procedures:  Ultrasound; NST    (2017

   08:50:Kenisha Lipscomb RN)

Current Pregnancy Procedures:  None    (2017 08:50:Kenisha Lipscomb RN)

History of Previous :  No    (2017 08:50:Kenisha Lipscomb RN)

History of Gestational Diabetes:  No    (2017 08:50:Kenisha Lipscomb RN)

History of PIH:  No    (2017 08:50:Kenisha Lipscomb RN)

History of Incompetent Cervix:  No    (2017 08:50:Kenisha Lipscomb RN)

History of Placenta Previa/Abrup:  No    (2017 08:50:Kenisha Lipscomb RN)

History of Macrosomia:  No    (2017 08:50:Kenisha Lipscomb RN)

History of IUGR:  No    (2017 08:50:Kenisha Lipscomb RN)

History of Postpartum Hemorrhage:  No    (2017 08:50:Kenisha Lipscomb RN)

History of Loss/Stillborn:  No    (2017 08:50:Kenisha Lipscomb RN)

History of  Death:  No    (2017 08:50:Kenisha Lipscomb RN)

History of D (Rh) Sensitization:  No    (2017 08:50:Kenisha Lipscomb RN)

History Recurrent Loss/Stillborn:  No    (2017 08:50:Kenisha Lipscomb RN)

History Depression/PP Depression:  No    (2017 08:50:Kenisha Lipscomb RN)

History of  Uterine Anomaly/PB:  No    (2017 08:50:Kenisha Lipscomb RN)

History of Infertility:  No    (2017 08:50:Kenisha Lipscomb RN)

History of  ART Treatment:  No    (2017 08:50:Kenisha Lipscomb RN)

History of PB:  No    (2017 08:50:Kenisha Lipscomb RN)

Comments Obstetrical History:  G1- ,Baby boy, 6 lb 9 oz, 

   G2- ,Baby girl, 7lb 10 oz, 

   G3-  Baby girl,8lbs, 2016, received cytotec 600mcg [place]

   G4- current pregnancy, no prenatal care, PPPROM    (2017

   08:50:Florida Bowens RN)

   

=================================================================

MEDICAL HISTORY

=================================================================

   

Med Hx Diabetes:  No    (2017 08:50:Kenisha Lipscomb RN)

Med Hx Hypertension:  No    (2017 08:50:Kenisha Lipscomb RN)

Med Hx Heart Disease:  No    (2017 08:50:Kenisha Lipscomb RN)

Med Hx Autoimmune Disorder:  No    (2017 08:50:Kenisha Lipscomb RN)

Med Hx Kidney Disease/UTI:  No    (2017 08:50:Kenisha Lipscomb RN)

Med Hx Neurologic/Epilepsy:  No    (2017 08:50:Kenisha Lipscomb RN)

Med Hx Psychiatric Disorders:  No    (2017 08:50:Kenisha Lipscomb RN)

Med Hx Hepatitis/Liver Disease:  No    (2017 08:50:Kenisha Lipscomb RN)

Med Hx Varicosities/Phlebitis:  No    (2017 08:50:Kenisha Lipscomb RN)

Med Hx Thyroid Dysfunction:  No    (2017 08:50:Kenisha Lipscomb RN)

Med Hx Trauma/Violence:  No    (2017 08:50:Kenisha Lipscomb RN)

Med Hx Blood Transfusion:  No    (2017 08:50:Kenisha Lipscomb RN)

Med Hx Pulmonary (Asthma,TB):  No    (2017 08:50:Kenisha Lipscomb RN)

Med Hx Breast:  No    (2017 08:50:Kenisha Lipscomb RN)

Med Hx GYN Surgery:  No    (2017 08:50:Kenisha Lipscomb RN)

Med Hx Hospitalization/Surgery:  Yes    (2017 08:50:[place] Bowens RN)

Med Hx Anesthetic Complications:  No    (2017 08:50:Kenisha Lipscomb RN)

Med Hx Abnormal Pap Smear:  No    (2017 08:50:Kenisha Lipscomb RN)

Other Medical Diseases:  No    (2017 08:50:Kenisha Lipscomb RN)

Med Hx Significant Family Hx:  No    (2017 08:50:Kenisha Lipscomb RN)

Details of Med/Surg Hx:  childbirth x3, closed spaced pregnancies    

   (2017 08:50:Florida Bowens RN)

   

=================================================================

INFECTIOUS HISTORY

=================================================================

   

Inf Hx Gonorrhea:  No    (2017 08:50:Kenisha Lipscomb RN)

Inf Hx Chlamydia:  Yes    (2017 08:50:Florida Bowens RN)

Inf Hx Syphilis:  No    (2017 08:50:Kenisha Lipscomb RN)

Inf Hx HIV/AIDS:  No    (2017 08:50:Kenisha Lipscomb RN)

Inf Hx Human Papilloma Virus:  No    (2017 08:50:Kenisha Lipscomb RN)

Inf Hx Pt/Partner Genital Herpes:  No    (2017 08:50:Kenisha Lipscomb RN)

Inf Hx Tuberculosis/Exposure:  No    (2017 08:50:Kenisha Lipscomb RN)

Inf Hx Hepatitis B,C:  No    (2017 08:50:Kenisha Lipscomb RN)

Inf Hx Rash or Viral Illness:  No    (2017 08:50:Kenisha Lipscomb RN)

Details of Infectious Hx:  chlamydia     (2017 08:50:Florida Bowens RN)

   

=================================================================

GENETIC HISTORY

=================================================================

   

Gen Hx Age >=35 at KEMI:  No    (2017 08:50:Kenisha Lipscomb RN)

Gen Hx Thalassemia:  No    (2017 08:50:Kenisha Lipscomb RN)

Gen Hx Congenital Heart Defect:  No    (2017 08:50:Kenisha Lipscomb RN)

Gen Hx Neural Tube Defect:  No    (2017 08:50:Kenisha Lipscomb RN)

Gen Hx Down's Syndrome:  No    (2017 08:50:Kenisha Lipscomb RN)

Gen Hx Emeterio-Sachs:  No    (2017 08:50:Kenisha Lipscomb RN)

Gen Hx [place]:  No    (2017 08:50:Kenisha Lipscomb RN)

Gen Hx Familial Dysautonomia:  No    (2017 08:50:Kenisha Lipscomb RN)

Gen Hx Sickle Cell Disease/Trait:  No    (2017 08:50:Kenisha Lipscomb RN)

Gen Hx Hemophilia/Blood Disorder:  No    (2017 08:50:Kenisha Lipscomb RN)

Gen Hx Muscular Dystrophy:  No    (2017 08:50:Kenisha Lipscomb RN)

Gen Hx Cystic Fibrosis:  No    (2017 08:50:Kenisha Lipscomb RN)

Gen Hx Huntingtons Chorea:  No    (2017 08:50:Kenisha Lipscomb RN)

Gen Hx Mental Retardation/Autism:  No    (2017 08:50:Kenisha Lipscomb RN)

Gen Hx Tested for Fragile X:  No    (2017 08:50:Kenisha Lipscomb RN)

Gen Hx Other Inher/Chromosomal:  No    (2017 08:50:Kenisha Lipscomb RN)

Gen Hx Maternal Metabolic DO:  No    (2017 08:50:Kenisha Lipscomb RN)

Gen Hx Pt Father or FOB Defect:  No    (2017 08:50:Kenisha Lipscomb RN)

Gen Hx Other Genetic History:  No    (2017 08:50:Kenisha Lipscomb RN)

Gen Hx Drugs/Meds since LMP:  No    (2017 08:50:Kenisha Lipscomb RN)

## 2017-04-12 NOTE — L&D GENERAL ADMISSION
=================================================================

General Admit

=================================================================

Datetime Report Generated by CPN: 2017 20:20

   

   

=================================================================

PREGNANCY INFORMATION

=================================================================

   

Patient Age:  21    (2017 08:38:QS system process)

EDC:  2017 00:00    (2017 08:50:Carlyn Joshua)

:  4    (2017 08:50:Crystal Avoca, RN)

Para:  3    (2017 08:50:Crystal Avoca, RN)

Baby, Number in Womb:  1    (2017 08:50:Crystal Deisi, RN)

   

=================================================================

PRENATAL CARE

=================================================================

   

Adequate Prenatal Care:  No    (2017 08:50:Crystal Deisi,

   RN)

Height (in):  59    (2017 10:46:QS system process)

   

=================================================================

ALLERGIES

=================================================================

   

Medication Allergy:  No    (2017 08:50:Crystal Deisi, RN)

Medication Allergies:  No Known Allergies (2017)    (2017

   05:48:QS system process)

Latex Allergy:  No Latex Allergies    (2017 08:50:Crystal

   Avoca, RN)

   

=================================================================

COMMUNICATION

=================================================================

   

Primary Language:  English    (2017 08:50:Crystal Avoca, RN)

Medical Tx Preferred Language:  English    (2017 08:50:Crystal

   Edisi, RN)

   

=================================================================

DEMOGRAPHICS

=================================================================

   

Address:  [address] [place] [address]   [id number]    (2017 08:38:QS system process)

Zipcode:  [address]    (2017 08:38:QS system process)

Home Telephone:  [phone]    (2017 08:38:QS system process)

SSN:      (2017 08:38:QS system process)

Next of Kin Name:  CATHERINECONRAD RICH    (2017 08:38:QS system

   process)

Next of Kin Phone:  [phone]    (2017 08:38:QS system

   process)

Next of Kin Relationship:  FA    (2017 08:38:QS system process)

YOB: 1995    (2017 08:38:QS system process)

Marital Status:  Single    (2017 08:38:QS system process)

Sex:  Female    (2017 08:38:QS system process)

Race:  Other    (2017 08:38:QS system process)

Ethnicity:   or     (2017 08:38:QS system process)

Adventist:  [employer]    (2017 08:38:QS system process)

   

=================================================================

DRUG AND ALCOHOL USE

=================================================================

   

Alcohol:  No    (2017 08:50:Crystal Avoca, RN)

Cigarettes:  Never Smoker. [id number]    (2017 08:50:Kenisha Lipscomb RN)

Marijuana:  No    (2017 08:50:Kenisha Lipscomb RN)

Cocaine:  No    (2017 08:50:Kenisha Lipscomb RN)

Other Illicit Drugs:  No    (2017 08:50:Kenisha Lipscomb RN)

   

=================================================================

VACCINE HISTORY

=================================================================

   

Influenza Vaccine:  No    (2017 08:50:Kenisha Lipscomb RN)

Pneumococcal Vaccine:  No    (2017 08:50:Kenisha Lipscomb RN)

Tetanus Vaccine:  No    (2017 08:50:Kenisha Lipscomb RN)

Tdap Vaccine:  No    (2017 08:50:Kenisha Lipscomb RN)

Hepatitis B Vaccine:  No    (2017 08:50:Kenisha Lipscomb RN)

   

=================================================================



=================================================================

   

Pediatrician:  [facility]    (2017 08:50:Kenisha Lipscomb RN)

Feeding Preference:  Formula    (2017 08:50:Kenisha Lipscomb RN)

Benefit of Breast Feed Discussed:  Yes    (2017 08:50:Kenisha Lipscomb RN)

Circumcision:  unknown sex     (2017 08:50:Florida Bowens RN)

Prenatal Classes Attended:  No    (2017 08:50:[place] Bowens RN)

Tubal Ligation:  No    (2017 08:50:Kenisha Lipscomb RN)

Tubal Authorization Signed:  N/A    (2017 08:50:Kenisha Lipscomb RN)

 Consent:  N/A    (2017 08:50:Kenisha Lipscomb RN)

 Consent Signed:  N/A    (2017 08:50:Kenisha Lipscomb RN)

Pain Management Plans:  Epidural    (2017 08:50:Kenisha Lipscomb RN)

Plans for Labor and Delivery:  None    (2017 08:50:Kenisha Lipscomb RN)

Cultural/Spritual Practice:  No    (2017 08:50:Kenisha Lipscomb RN)

Spir/Cult Dietary Needs:  No    (2017 08:50:Kenisha Lipscomb RN)

   

=================================================================

LIVING SITUATION/DISCHARGE PLAN

=================================================================

   

Living Arrangements:  House    (2017 08:50:Kenisha Lipscomb RN)

Adequate Access to::  Electric; Heat; Refrigeration; Plumbing/Running

   water; Phone; Transportation    (2017 08:50:Kenisha Lipscomb RN)

WIC Program:  No    (2017 08:50:Kenisha Lipscomb RN)

Discharge  Person:  patient unsure    (2017 08:50:Florida Bowens RN)

Person to Help after Discharge:  patient unsure    (2017

   08:50:Florida Bowens RN)

Currently Using Commun Resources:  No    (2017 08:50:Kenisha Lipscomb RN)

Outside Agency/:  No    (2017 08:50:Kenisha Lipscomb RN)

Car Seat for Discharge:  Yes    (2017 08:50:Kenisha Lipscomb RN)

Adoption Requested:  Yes    (2017 08:50:Kenisha Lipscomb RN)

Agency/Agent Handling Adoption:  discharge planning has patient in

   touch with [employer] and patient has paperwork at bedside

   to review     (2017 08:50:Florida Bowens RN)

Pt Contact w/infant Post Birth:  N/A    (2017 08:50:Kenisha Lipscomb RN)

   

=================================================================

PRENATAL LABS

=================================================================

   

Blood Type:  O Positive    (2017 08:50:Kenisha Lipscomb RN)

Antibody Screen:  Negative    (2017 08:50:Kenisha Lipscomb RN)

Hemoglobin:  10.8 L    (2017 06:45:QS system process)

Hematocrit:  33.5 L    (2017 06:45:QS system process)

MCV:  75 L    (2017 06:45:QS system process)

Group Beta Strep:  1 NO GROUP B STREPTOCOCCUS RECOVERED    (2017

   09:20:QS system process)

Gonorrhea:  Negative    (2017 08:50:Kenisha Lipscomb RN)

Chlamydia:  Negative    (2017 08:50:Kenisha Lipscomb RN)

RPR/VDRL:  Nonreactive    (2017 08:50:Florida Bowens RN)

HIV Results:  NEGATIVE    (2017 09:26:QS system process)

Hepatitis B:  Negative    (2017 08:50:Florida Bowens RN)

Rubella:  POSITIVE

   NEGATIVE IF LESS THAN OR EQUAL TO 9.99 IU/mL

   POSITIVE IF GREATER THAN OR EQUAL TO 10.0 IU/mL    (2017

   09:26:QS system process)

Rubella Titer:  19.00    (2017 09:26:QS system process)

   

=================================================================

OB/PREVIOUS PREGNANCY HISTORY

=================================================================

   

Previous Pregnancy Procedures:  Ultrasound; NST    (2017

   08:50:Kenisha Lipscomb RN)

Current Pregnancy Procedures:  None    (2017 08:50:Kenisha Lipscomb RN)

History of Previous :  No    (2017 08:50:Kenisha Lipscomb RN)

History of Gestational Diabetes:  No    (2017 08:50:Kenisha Lipscomb RN)

History of PIH:  No    (2017 08:50:Kenisha Lipscomb RN)

History of Incompetent Cervix:  No    (2017 08:50:Kenisha Lipscomb RN)

History of Placenta Previa/Abrup:  No    (2017 08:50:Kenisha Lipscomb RN)

History of Macrosomia:  No    (2017 08:50:Kenisha Lipscomb RN)

History of IUGR:  No    (2017 08:50:Kenisha Lipscomb RN)

History of Postpartum Hemorrhage:  No    (2017 08:50:Kenisha Lipscomb RN)

History of Loss/Stillborn:  No    (2017 08:50:Kenisha Lipscomb RN)

History of  Death:  No    (2017 08:50:Kenisha Lipscomb RN)

History of D (Rh) Sensitization:  No    (2017 08:50:Kenisha Lipscomb RN)

History Recurrent Loss/Stillborn:  No    (2017 08:50:Kenisha Lipscomb RN)

History Depression/PP Depression:  No    (2017 08:50:Kenisha Lipscomb RN)

History of  Uterine Anomaly/PB:  No    (2017 08:50:Kenisha Lipscomb RN)

History of Infertility:  No    (2017 08:50:Kenisha Lipscomb RN)

History of  ART Treatment:  No    (2017 08:50:Kenisha Lipscomb RN)

History of PB:  No    (2017 08:50:Kenisha Lipscomb RN)

Comments Obstetrical History:  G1- ,Baby boy, 6 lb 9 oz, 

   G2- ,Baby girl, 7lb 10 oz, 

   G3-  Baby girl,8lbs, 2016, received cytotec 600mcg [place]

   G4- current pregnancy, no prenatal care, PPPROM    (2017

   08:50:Florida Bowens RN)

   

=================================================================

MEDICAL HISTORY

=================================================================

   

Med Hx Diabetes:  No    (2017 08:50:Kenisha Lipscomb RN)

Med Hx Hypertension:  No    (2017 08:50:Kenisha Lipscomb RN)

Med Hx Heart Disease:  No    (2017 08:50:Kenisha Lipscomb RN)

Med Hx Autoimmune Disorder:  No    (2017 08:50:Kenisha Lipscomb RN)

Med Hx Kidney Disease/UTI:  No    (2017 08:50:Kenisha Lipscomb RN)

Med Hx Neurologic/Epilepsy:  No    (2017 08:50:Kenisha Lipscomb RN)

Med Hx Psychiatric Disorders:  No    (2017 08:50:Kenisha Lipscomb RN)

Med Hx Hepatitis/Liver Disease:  No    (2017 08:50:Kenisha Lipscomb RN)

Med Hx Varicosities/Phlebitis:  No    (2017 08:50:Kenisha Lipscomb RN)

Med Hx Thyroid Dysfunction:  No    (2017 08:50:Kenisha Lipscomb RN)

Med Hx Trauma/Violence:  No    (2017 08:50:Kenisha Lipscomb RN)

Med Hx Blood Transfusion:  No    (2017 08:50:Kenisha Lipscomb RN)

Med Hx Pulmonary (Asthma,TB):  No    (2017 08:50:Kenisha Lipscomb RN)

Med Hx Breast:  No    (2017 08:50:Kenisha Lipscomb RN)

Med Hx GYN Surgery:  No    (2017 08:50:Kenisha Lipscomb RN)

Med Hx Hospitalization/Surgery:  Yes    (2017 08:50:Florida Bowens RN)

Med Hx Anesthetic Complications:  No    (2017 08:50:Kenisha Lipscomb RN)

Med Hx Abnormal Pap Smear:  No    (2017 08:50:Kenisha Lipscomb RN)

Other Medical Diseases:  No    (2017 08:50:Kenisha Lipscomb RN)

Med Hx Significant Family Hx:  No    (2017 08:50:Kenisha Lipscomb RN)

Details of Med/Surg Hx:  childbirth x3, closed spaced pregnancies    

   (2017 08:50:Florida Bowens RN)

   

=================================================================

INFECTIOUS HISTORY

=================================================================

   

Inf Hx Gonorrhea:  No    (2017 08:50:Kenisha Lipscomb RN)

Inf Hx Chlamydia:  Yes    (2017 08:50:Florida Bowens RN)

Inf Hx Syphilis:  No    (2017 08:50:Kenisha Lipscomb RN)

Inf Hx HIV/AIDS:  No    (2017 08:50:Kenisha Lipscomb RN)

Inf Hx Human Papilloma Virus:  No    (2017 08:50:Kenisha Lipscomb RN)

Inf Hx Pt/Partner Genital Herpes:  No    (2017 08:50:Kenisha Lipscomb RN)

Inf Hx Tuberculosis/Exposure:  No    (2017 08:50:Kenisha Lipscomb RN)

Inf Hx Hepatitis B,C:  No    (2017 08:50:Kenisha Lipscomb RN)

Inf Hx Rash or Viral Illness:  No    (2017 08:50:Kenisha Lipscomb RN)

Details of Infectious Hx:  chlamydia     (2017 08:50:Florida Bowens RN)

   

=================================================================

GENETIC HISTORY

=================================================================

   

Gen Hx Age >=35 at KEMI:  No    (2017 08:50:Kenisha Lipscomb RN)

Gen Hx Thalassemia:  No    (2017 08:50:Kenisha Lipscomb RN)

Gen Hx Congenital Heart Defect:  No    (2017 08:50:Kenisha Lipscomb RN)

Gen Hx Neural Tube Defect:  No    (2017 08:50:Kenisha Lipscomb RN)

Gen Hx Down's Syndrome:  No    (2017 08:50:Kenisha Lipscomb RN)

Gen Hx Emeterio-Sachs:  No    (2017 08:50:Kenisha Lipscomb RN)

Gen Hx Canavan:  No    (2017 08:50:Kenisha Lipscomb RN)

Gen Hx Familial Dysautonomia:  No    (2017 08:50:Kenisha Lipscomb RN)

Gen Hx Sickle Cell Disease/Trait:  No    (2017 08:50:Kenisha Lipscomb RN)

Gen Hx Hemophilia/Blood Disorder:  No    (2017 08:50:Kenisha Lipscomb RN)

Gen Hx Muscular Dystrophy:  No    (2017 08:50:Kenisha Lipscomb RN)

Gen Hx Cystic Fibrosis:  No    (2017 08:50:Kenisha Lipscomb RN)

Gen Hx Huntingtons Chorea:  No    (2017 08:50:Kenisha Lipscomb RN)

Gen Hx Mental Retardation/Autism:  No    (2017 08:50:Kenisha Lipscomb RN)

Gen Hx Tested for Fragile X:  No    (2017 08:50:Kenisha Lipscomb RN)

Gen Hx Other Inher/Chromosomal:  No    (2017 08:50:Kenisha Lipscomb RN)

Gen Hx Maternal Metabolic DO:  No    (2017 08:50:Kenisha Lipscomb RN)

Gen Hx Pt Father or FOB Defect:  No    (2017 08:50:Kenisha Lipscomb RN)

Gen Hx Other Genetic History:  No    (2017 08:50:Kenisha Lipscomb RN)

Gen Hx Drugs/Meds since LMP:  No    (2017 08:50:Kenisha Lipscomb RN)

## 2017-04-12 NOTE — L&D GENERAL ADMISSION
=================================================================

General Admit

=================================================================

Datetime Report Generated by CPN: 2017 18:00

   

   

=================================================================

PREGNANCY INFORMATION

=================================================================

   

Patient Age:  21    (2017 08:38:QS system process)

EDC:  2017 00:00    (2017 08:50:Carlyn Joshua)

:  4    (2017 08:50:Crystal [place], RN)

Para:  3    (2017 08:50:Crystal [place], RN)

Baby, Number in Womb:  1    (2017 08:50:Crystal Deisi, RN)

   

=================================================================

PRENATAL CARE

=================================================================

   

Adequate Prenatal Care:  No    (2017 08:50:Crystal Deisi,

   RN)

Height (in):  59    (2017 10:46:QS system process)

   

=================================================================

ALLERGIES

=================================================================

   

Medication Allergy:  No    (2017 08:50:Crystal Deisi, RN)

Medication Allergies:  No Known Allergies (2017)    (2017

   05:48:QS system process)

Latex Allergy:  No Latex Allergies    (2017 08:50:Crystal

   [place], RN)

   

=================================================================

COMMUNICATION

=================================================================

   

Primary Language:  English    (2017 08:50:Crystal [place], RN)

Medical Tx Preferred Language:  English    (2017 08:50:Crystal

   Deisi, RN)

   

=================================================================

DEMOGRAPHICS

=================================================================

   

Address:  [address] [place] [address]   [address]    (2017 08:38:QS system process)

Zipcode:  [address]    (2017 08:38:QS system process)

Home Telephone:  [phone]    (2017 08:38:QS system process)

SSN:      (2017 08:38:QS system process)

Next of Kin Name:  CATHERINECONRAD RICH    (2017 08:38:QS system

   process)

Next of Kin Phone:  [phone]    (2017 08:38:QS system

   process)

Next of Kin Relationship:  FA    (2017 08:38:QS system process)

YOB: 1995    (2017 08:38:QS system process)

Marital Status:  Single    (2017 08:38:QS system process)

Sex:  Female    (2017 08:38:QS system process)

Race:  Other    (2017 08:38:QS system process)

Ethnicity:   or     (2017 08:38:QS system process)

Caodaism:  Christianity    (2017 08:38:QS system process)

   

=================================================================

DRUG AND ALCOHOL USE

=================================================================

   

Alcohol:  No    (2017 08:50:Crystal [place], RN)

Cigarettes:  Never Smoker. [id number]    (2017 08:50:Kenisha Lipscomb RN)

Marijuana:  No    (2017 08:50:Kenisha Lipscomb RN)

Cocaine:  No    (2017 08:50:Kenisha Lipscomb RN)

Other Illicit Drugs:  No    (2017 08:50:Kenisha Lipscomb RN)

   

=================================================================

VACCINE HISTORY

=================================================================

   

Influenza Vaccine:  No    (2017 08:50:Kenisha Lipscomb RN)

Pneumococcal Vaccine:  No    (2017 08:50:Kenisha Lipscomb RN)

Tetanus Vaccine:  No    (2017 08:50:Kenisha Lipscomb RN)

Tdap Vaccine:  No    (2017 08:50:Kenisha Lipscomb RN)

Hepatitis B Vaccine:  No    (2017 08:50:Kenisha Lipscomb RN)

   

=================================================================



=================================================================

   

Pediatrician:  [facility]    (2017 08:50:Kenisha Lipscomb RN)

Feeding Preference:  Formula    (2017 08:50:Kenisha Lipscomb RN)

Benefit of Breast Feed Discussed:  Yes    (2017 08:50:Kenisha Lipscomb RN)

Circumcision:  unknown sex     (2017 08:50:Florida Bowens RN)

Prenatal Classes Attended:  No    (2017 08:50:[place] Bowens RN)

Tubal Ligation:  No    (2017 08:50:Kenisha Lipscomb RN)

Tubal Authorization Signed:  N/A    (2017 08:50:Kenisha Lipscomb RN)

 Consent:  N/A    (2017 08:50:Kenisha Lipscomb RN)

 Consent Signed:  N/A    (2017 08:50:Kenisha Lipscomb RN)

Pain Management Plans:  Epidural    (2017 08:50:Kenisha Lipscomb RN)

Plans for Labor and Delivery:  None    (2017 08:50:Kenisha Lipscomb RN)

Cultural/Spritual Practice:  No    (2017 08:50:Kenisha Lipscomb RN)

Spir/Cult Dietary Needs:  No    (2017 08:50:Kenisha Lipscomb RN)

   

=================================================================

LIVING SITUATION/DISCHARGE PLAN

=================================================================

   

Living Arrangements:  House    (2017 08:50:Kenisha Lipscomb RN)

Adequate Access to::  Electric; Heat; Refrigeration; Plumbing/Running

   water; Phone; Transportation    (2017 08:50:Kenisha Lipscomb RN)

WIC Program:  No    (2017 08:50:Kenisha Lipscomb RN)

Discharge  Person:  patient unsure    (2017 08:50:Florida Bowens RN)

Person to Help after Discharge:  patient unsure    (2017

   08:50:Florida oBwens RN)

Currently Using Commun Resources:  No    (2017 08:50:Kenisha Lipscomb RN)

Outside Agency/:  No    (2017 08:50:Kenisha Lipscomb RN)

Car Seat for Discharge:  Yes    (2017 08:50:Kenisha Lipscomb RN)

Adoption Requested:  Yes    (2017 08:50:Kenisha Lipscomb RN)

Agency/Agent Handling Adoption:  discharge planning has patient in

   touch with [employer] and patient has paperwork at bedside

   to review     (2017 08:50:[place] Bowens RN)

Pt Contact w/infant Post Birth:  N/A    (2017 08:50:Kenisha Lipscomb RN)

   

=================================================================

PRENATAL LABS

=================================================================

   

Blood Type:  O Positive    (2017 08:50:Kenisha Lipscomb RN)

Antibody Screen:  Negative    (2017 08:50:Kenisha Lipscomb RN)

Hemoglobin:  10.8 L    (2017 06:45:QS system process)

Hematocrit:  33.5 L    (2017 06:45:QS system process)

MCV:  75 L    (2017 06:45:QS system process)

Group Beta Strep:  1 NO GROUP B STREPTOCOCCUS RECOVERED    (2017

   09:20:QS system process)

Gonorrhea:  Negative    (2017 08:50:Kenisha Lipscomb RN)

Chlamydia:  Negative    (2017 08:50:Kenisha Lipscomb RN)

RPR/VDRL:  Nonreactive    (2017 08:50:Florida Bowens RN)

HIV Results:  NEGATIVE    (2017 09:26:QS system process)

Hepatitis B:  Negative    (2017 08:50:Florida Bowens RN)

Rubella:  POSITIVE

   NEGATIVE IF LESS THAN OR EQUAL TO 9.99 IU/mL

   POSITIVE IF GREATER THAN OR EQUAL TO 10.0 IU/mL    (2017

   09:26:QS system process)

Rubella Titer:  19.00    (2017 09:26:QS system process)

   

=================================================================

OB/PREVIOUS PREGNANCY HISTORY

=================================================================

   

Previous Pregnancy Procedures:  Ultrasound; NST    (2017

   08:50:Kenisha Lipscomb RN)

Current Pregnancy Procedures:  None    (2017 08:50:Kenisha Lipscomb RN)

History of Previous :  No    (2017 08:50:Kenisha Lipscomb RN)

History of Gestational Diabetes:  No    (2017 08:50:Kenisha Lipscomb RN)

History of PIH:  No    (2017 08:50:Kenisha Lipscomb RN)

History of Incompetent Cervix:  No    (2017 08:50:Kenisha Lipscomb RN)

History of Placenta Previa/Abrup:  No    (2017 08:50:Kenisah Lipscomb RN)

History of Macrosomia:  No    (2017 08:50:Kenisha Lipscomb RN)

History of IUGR:  No    (2017 08:50:Kenisha Lipscomb RN)

History of Postpartum Hemorrhage:  No    (2017 08:50:Kenisha Lipscomb RN)

History of Loss/Stillborn:  No    (2017 08:50:Kenisha Lipscomb RN)

History of  Death:  No    (2017 08:50:Kenisha Lipscomb RN)

History of D (Rh) Sensitization:  No    (2017 08:50:Kenihsa Lipscomb RN)

History Recurrent Loss/Stillborn:  No    (2017 08:50:Kenisha Lipscomb RN)

History Depression/PP Depression:  No    (2017 08:50:Kenisha Lipscomb RN)

History of  Uterine Anomaly/PB:  No    (2017 08:50:Kenisha Lipscomb RN)

History of Infertility:  No    (2017 08:50:Kenisha Lipscomb RN)

History of  ART Treatment:  No    (2017 08:50:Kenisha Lipscomb RN)

History of PB:  No    (2017 08:50:Kenisha Lipscomb RN)

Comments Obstetrical History:  G1- ,Baby boy, 6 lb 9 oz, 

   G2- ,Baby girl, 7lb 10 oz, 

   G3-  Baby girl,8lbs, 2016, received cytotec 600mcg CA

   G4- current pregnancy, no prenatal care, PPPROM    (2017

   08:50:[place] Bowens RN)

   

=================================================================

MEDICAL HISTORY

=================================================================

   

Med Hx Diabetes:  No    (2017 08:50:Kenisha Lipscomb RN)

Med Hx Hypertension:  No    (2017 08:50:Kenisha Lipscomb RN)

Med Hx Heart Disease:  No    (2017 08:50:Kenisha Lipscomb RN)

Med Hx Autoimmune Disorder:  No    (2017 08:50:Kenisha Lipscomb RN)

Med Hx Kidney Disease/UTI:  No    (2017 08:50:Kenisha Lipscomb RN)

Med Hx Neurologic/Epilepsy:  No    (2017 08:50:Kenisha Lipscomb RN)

Med Hx Psychiatric Disorders:  No    (2017 08:50:Kenisha Lipscomb RN)

Med Hx Hepatitis/Liver Disease:  No    (2017 08:50:Kenisha Lipscomb RN)

Med Hx Varicosities/Phlebitis:  No    (2017 08:50:Kenisha Lipscomb RN)

Med Hx Thyroid Dysfunction:  No    (2017 08:50:Kenisha Lipscomb RN)

Med Hx Trauma/Violence:  No    (2017 08:50:Kenisha Lipscomb RN)

Med Hx Blood Transfusion:  No    (2017 08:50:Kenisha Lipscomb RN)

Med Hx Pulmonary (Asthma,TB):  No    (2017 08:50:Kenisha Lipscomb RN)

Med Hx Breast:  No    (2017 08:50:Kenisha Lipscomb RN)

Med Hx GYN Surgery:  No    (2017 08:50:Kenisha Lipscomb RN)

Med Hx Hospitalization/Surgery:  Yes    (2017 08:50:[place] Bowens RN)

Med Hx Anesthetic Complications:  No    (2017 08:50:Kenisha Lipscomb RN)

Med Hx Abnormal Pap Smear:  No    (2017 08:50:Kenisha Lipscomb RN)

Other Medical Diseases:  No    (2017 08:50:Kenisha Lipscomb RN)

Med Hx Significant Family Hx:  No    (2017 08:50:Kenisha Lipscomb RN)

Details of Med/Surg Hx:  childbirth x3, closed spaced pregnancies    

   (2017 08:50:[place] Bowens RN)

   

=================================================================

INFECTIOUS HISTORY

=================================================================

   

Inf Hx Gonorrhea:  No    (2017 08:50:Kenisha Lipscomb RN)

Inf Hx Chlamydia:  Yes    (2017 08:50:[place] Bowens RN)

Inf Hx Syphilis:  No    (2017 08:50:Kenisha Lipscomb RN)

Inf Hx HIV/AIDS:  No    (2017 08:50:Kenisha Lipscomb RN)

Inf Hx Human Papilloma Virus:  No    (2017 08:50:Kenisha Lipscomb RN)

Inf Hx Pt/Partner Genital Herpes:  No    (2017 08:50:Kenisha Lipscomb RN)

Inf Hx Tuberculosis/Exposure:  No    (2017 08:50:Kenisha Lipscomb RN)

Inf Hx Hepatitis B,C:  No    (2017 08:50:Kenisha Lipscomb RN)

Inf Hx Rash or Viral Illness:  No    (2017 08:50:Kenisha Lipscomb RN)

Details of Infectious Hx:  chlamydia     (2017 08:50:Florida Bowens RN)

   

=================================================================

GENETIC HISTORY

=================================================================

   

Gen Hx Age >=35 at KEMI:  No    (2017 08:50:Kenisha Lipscomb RN)

Gen Hx Thalassemia:  No    (2017 08:50:Kenisha Lipscomb RN)

Gen Hx Congenital Heart Defect:  No    (2017 08:50:Kenisha Lipscomb RN)

Gen Hx Neural Tube Defect:  No    (2017 08:50:Kenisha Lipscomb RN)

Gen Hx Down's Syndrome:  No    (2017 08:50:Kenisha Lipscomb RN)

Gen Hx Emeterio-Sachs:  No    (2017 08:50:Kenisha Lipscomb RN)

Gen Hx [place]avan:  No    (2017 08:50:Kenisha Lipscomb RN)

Gen Hx Familial Dysautonomia:  No    (2017 08:50:Kenisha Lipscomb RN)

Gen Hx Sickle Cell Disease/Trait:  No    (2017 08:50:Kenisha Lipscomb RN)

Gen Hx Hemophilia/Blood Disorder:  No    (2017 08:50:Kenisha Lipscomb RN)

Gen Hx Muscular Dystrophy:  No    (2017 08:50:Kenisha Lipscomb RN)

Gen Hx Cystic Fibrosis:  No    (2017 08:50:Kenisha Lipscomb RN)

Gen Hx Huntingtons Chorea:  No    (2017 08:50:Kenisha Lipscomb RN)

Gen Hx Mental Retardation/Autism:  No    (2017 08:50:Kenisha Lipscomb RN)

Gen Hx Tested for Fragile X:  No    (2017 08:50:Kenisha Lipscomb RN)

Gen Hx Other Inher/Chromosomal:  No    (2017 08:50:Kenisha Lipscomb RN)

Gen Hx Maternal Metabolic DO:  No    (2017 08:50:Kenisha Lipscomb RN)

Gen Hx Pt Father or FOB Defect:  No    (2017 08:50:Kenisha Lipscomb RN)

Gen Hx Other Genetic History:  No    (2017 08:50:Kenisha Lipscomb RN)

Gen Hx Drugs/Meds since LMP:  No    (2017 08:50:Kenisha Lipscomb RN)

## 2017-04-12 NOTE — L&D CURRENT ADMISSION
=================================================================

Current Admit

=================================================================

Datetime Report Generated by CPN: 04/12/2017 18:00

   

   

=================================================================

ADMISSION INFORMATION

=================================================================

   

Current Admit Date/Time:  04/06/2017 08:52    (04/06/2017 08:25:Kenisha Lipscomb RN)

Reason for Admission:  Rupture of Membranes    (04/06/2017

   08:25:Kenisha Lipscomb RN)

Chief Complaint:  rapture of membranes    (04/06/2017 19:07:Bria Roldan RN)

EGA per Dates:  33.3    (04/06/2017 08:50:QS system process)

Method of Arrival:  Wheelchair    (04/06/2017 08:25:Kenisha Lipscomb RN)

Admitted From:  Antepartum Unit    (04/06/2017 08:25:Kenisha Lipscomb RN)

Reason for Induction:  Not Applicable    (04/06/2017 08:25:Kenisha Lipscomb RN)

Prenatal Records Available:  No    (04/06/2017 08:25:Kenisha Lipscomb RN)

General Admission Information:  Updated    (04/06/2017 08:25:Kenisha Lipscomb RN)

   

=================================================================

BELONGINGS/ADVANCED DIRECTIVES

=================================================================

   

Valuables/Personal Effects:  Cell Phone    (04/06/2017 08:25:Kenisha Lipscomb RN)

Other Belongings:  see valuable consent     (04/06/2017 08:25:Florida Bowens RN)

Disposition of Belongings:  Kept with Patient    (04/06/2017

   08:25:Kenisha Lipscomb RN)

Advance Direct for Healthcare:  No, but Requests Information   

   (04/06/2017 08:25:Kenisha Lipscomb RN)

Durable Power of :  No    (04/06/2017 08:25:Kenisha Lipscomb RN)

Living Will:  No    (04/06/2017 08:25:Kenisha Lipscomb RN)

Organ Donor:  Yes    (04/06/2017 08:25:Kenisha Lipscomb RN)

Pt Rights Information Given:  Yes    (04/06/2017 08:25:Kenisha Lipscomb RN)

Pt Understands Pt Rights:  Yes    (04/06/2017 08:25:Kenisha Lipscomb RN)

   

=================================================================

LEARNING ASSESSMENT

=================================================================

   

Knowledge Level:  Understands L_D Process; Understands Care Activities;

   Had Pre-Hospital Education; Understands Diagnosis    (04/06/2017

   08:25:Kenisha Lipscomb RN)

Barriers to Learning:  None    (04/06/2017 08:25:Kenisha Lipscomb RN)

Learning Readiness:  Motivated    (04/06/2017 08:25:Kenisha Lipscomb RN)

Learns Best By:  1 to 1 Instruction    (04/06/2017 08:25:Kenisha Lipscomb RN)

Learning Needs:  Labor and Delivery Process; Pain Management; Symptoms

   to Report; Treatment Plan; Medication; Diagnosis; Nutrition;

   Equipment; Infant Care; Community Resources    (04/06/2017

   08:25:Kenisha Lipscomb RN)

   

=================================================================

DOMESTIC VIOLANCE SCREENING

=================================================================

   

Dom Viol Threatened/Hurt:  No    (04/06/2017 08:25:Kenisha Lipscomb RN)

Hx of Abuse/Neglect past 2yrs:  No    (04/06/2017 08:25:Kenisha Lipscomb RN)

Feel Unsafe Going Home:  No    (04/06/2017 08:25:Kenisha Lipscomb RN)

Addt'l Observ Indicating Abuse:  No    (04/06/2017 08:25:Kenisha Lipscomb RN)

Reason Unable to Complete Screen:  N/A, Screen Completed    (04/06/2017

   08:25:Kenisha Lipscomb RN)

Considered Personal Harm/Suicide:  No    (04/06/2017 08:25:Kenisha Lipscomb RN)

   

=================================================================

NUTRITIONAL/FUNCTIONAL SCREENING

=================================================================

   

Problem with Appetite >5 Days:  No    (04/06/2017 08:25:Kenisha Lipscomb RN)

Chew/Swallow Difficulties:  No    (04/06/2017 08:25:Kenisha Lipscomb RN)

Inappropriate Wt Gain/Loss:  No    (04/06/2017 08:25:Kenisha Lipscomb RN)

Presence Skin Breakdown/Ulcer:  No    (04/06/2017 08:25:Kenisha Lipscomb RN)

Special Diet:  No    (04/06/2017 08:25:Kenisha Lipscomb RN)

Pt Requests Dietician Visit:  No    (04/06/2017 08:25:Kenisha Lipscomb RN)

Hx of Any of the Following?:  N/A    (04/06/2017 08:25:Kenisha Lipscomb RN)

New Diagnosis of:  N/A    (04/06/2017 08:25:Kenisha Lipscomb RN)

Requires Assist w/Ambulation:  No    (04/06/2017 08:25:Kenisha Lipscomb RN)

Uses Assist Device to Ambulate:  No    (04/06/2017 08:25:Kenisha Lipscomb RN)

Pt Requires Help w/ADL's:  No    (04/06/2017 08:25:Kenisha Lipscomb RN)

## 2017-04-12 NOTE — L&D GENERAL ADMISSION
=================================================================

General Admit

=================================================================

Datetime Report Generated by CPN: 2017 20:20

   

   

=================================================================

PREGNANCY INFORMATION

=================================================================

   

Patient Age:  21    (2017 08:38:QS system process)

EDC:  2017 00:00    (2017 08:50:Carlyn Joshua)

:  4    (2017 08:50:Crystal Mylo, RN)

Para:  3    (2017 08:50:Crystal Mylo, RN)

Baby, Number in Womb:  1    (2017 08:50:Crystal Deisi, RN)

   

=================================================================

PRENATAL CARE

=================================================================

   

Adequate Prenatal Care:  No    (2017 08:50:Crystal Deisi,

   RN)

Height (in):  59    (2017 10:46:QS system process)

   

=================================================================

ALLERGIES

=================================================================

   

Medication Allergy:  No    (2017 08:50:Crystal Deisi, RN)

Medication Allergies:  No Known Allergies (2017)    (2017

   05:48:QS system process)

Latex Allergy:  No Latex Allergies    (2017 08:50:Crystal

   Mylo, RN)

   

=================================================================

COMMUNICATION

=================================================================

   

Primary Language:  English    (2017 08:50:Crystal Mylo, RN)

Medical Tx Preferred Language:  English    (2017 08:50:Crystal

   Deisi, RN)

   

=================================================================

DEMOGRAPHICS

=================================================================

   

Address:  [address] [place] [address]   [id number]    (2017 08:38:QS system process)

Zipcode:  [id number]    (2017 08:38:QS system process)

Home Telephone:  [phone]    (2017 08:38:QS system process)

SSN:      (2017 08:38:QS system process)

Next of Kin Name:  CATHERINECONRAD RICH    (2017 08:38:QS system

   process)

Next of Kin Phone:  [phone]    (2017 08:38:QS system

   process)

Next of Kin Relationship:  FA    (2017 08:38:QS system process)

YOB: 1995    (2017 08:38:QS system process)

Marital Status:  Single    (2017 08:38:QS system process)

Sex:  Female    (2017 08:38:QS system process)

Race:  Other    (2017 08:38:QS system process)

Ethnicity:   or     (2017 08:38:QS system process)

Adventist:  Confucianism    (2017 08:38:QS system process)

   

=================================================================

DRUG AND ALCOHOL USE

=================================================================

   

Alcohol:  No    (2017 08:50:Crystal Mylo, RN)

Cigarettes:  Never Smoker. [id number]    (2017 08:50:Kenisha Lipscomb RN)

Marijuana:  No    (2017 08:50:Kenisha Lipscomb RN)

Cocaine:  No    (2017 08:50:Kenisha Lipscomb RN)

Other Illicit Drugs:  No    (2017 08:50:Kenisha Lipscomb RN)

   

=================================================================

VACCINE HISTORY

=================================================================

   

Influenza Vaccine:  No    (2017 08:50:Kenisha Lipscomb RN)

Pneumococcal Vaccine:  No    (2017 08:50:Kenisha Lipscomb RN)

Tetanus Vaccine:  No    (2017 08:50:Kenisha Lipscomb RN)

Tdap Vaccine:  No    (2017 08:50:Kenisha Lipscomb RN)

Hepatitis B Vaccine:  No    (2017 08:50:Kenisha Lipscomb RN)

   

=================================================================



=================================================================

   

Pediatrician:  [facility]    (2017 08:50:Kenisha Lipscomb RN)

Feeding Preference:  Formula    (2017 08:50:Kenisha Lipscomb RN)

Benefit of Breast Feed Discussed:  Yes    (2017 08:50:Kenisha Lipscomb RN)

Circumcision:  unknown sex     (2017 08:50:Florida Bowens RN)

Prenatal Classes Attended:  No    (2017 08:50:[place] Bowens RN)

Tubal Ligation:  No    (2017 08:50:Kenisha Lipscomb RN)

Tubal Authorization Signed:  N/A    (2017 08:50:Kenisha Lipscomb RN)

 Consent:  N/A    (2017 08:50:Kenisha Lipscomb RN)

 Consent Signed:  N/A    (2017 08:50:Kenisha Lipscomb RN)

Pain Management Plans:  Epidural    (2017 08:50:Kenisha Lipscomb RN)

Plans for Labor and Delivery:  None    (2017 08:50:Kenisha Lipscomb RN)

Cultural/Spritual Practice:  No    (2017 08:50:Kenisha Lipscomb RN)

Spir/Cult Dietary Needs:  No    (2017 08:50:Kneisha Lipscomb RN)

   

=================================================================

LIVING SITUATION/DISCHARGE PLAN

=================================================================

   

Living Arrangements:  House    (2017 08:50:Kenisha Lipscomb RN)

Adequate Access to::  Electric; Heat; Refrigeration; Plumbing/Running

   water; Phone; Transportation    (2017 08:50:Kenisha Lipscomb RN)

WIC Program:  No    (2017 08:50:Kenisha Lipscomb RN)

Discharge  Person:  patient unsure    (2017 08:50:Florida Bowens RN)

Person to Help after Discharge:  patient unsure    (2017

   08:50:Florida Bowens RN)

Currently Using Commun Resources:  No    (2017 08:50:Kenisha Lipscomb RN)

Outside Agency/:  No    (2017 08:50:Kenisha Lipscomb RN)

Car Seat for Discharge:  Yes    (2017 08:50:Kenisha Lipscomb RN)

Adoption Requested:  Yes    (2017 08:50:Kenisha Lipscomb RN)

Agency/Agent Handling Adoption:  discharge planning has patient in

   touch with [employer] and patient has paperwork at bedside

   to review     (2017 08:50:Florida Bowens RN)

Pt Contact w/infant Post Birth:  N/A    (2017 08:50:Kenisha Lipscomb RN)

   

=================================================================

PRENATAL LABS

=================================================================

   

Blood Type:  O Positive    (2017 08:50:Kenisha Lipscomb RN)

Antibody Screen:  Negative    (2017 08:50:Kenisha Lipscomb RN)

Hemoglobin:  10.8 L    (2017 06:45:QS system process)

Hematocrit:  33.5 L    (2017 06:45:QS system process)

MCV:  75 L    (2017 06:45:QS system process)

Group Beta Strep:  1 NO GROUP B STREPTOCOCCUS RECOVERED    (2017

   09:20:QS system process)

Gonorrhea:  Negative    (2017 08:50:Kenisha Lipscomb RN)

Chlamydia:  Negative    (2017 08:50:Kenisha Lipscomb RN)

RPR/VDRL:  Nonreactive    (2017 08:50:Florida Bowens RN)

HIV Results:  NEGATIVE    (2017 09:26:QS system process)

Hepatitis B:  Negative    (2017 08:50:Florida Bowens RN)

Rubella:  POSITIVE

   NEGATIVE IF LESS THAN OR EQUAL TO 9.99 IU/mL

   POSITIVE IF GREATER THAN OR EQUAL TO 10.0 IU/mL    (2017

   09:26:QS system process)

Rubella Titer:  19.00    (2017 09:26:QS system process)

   

=================================================================

OB/PREVIOUS PREGNANCY HISTORY

=================================================================

   

Previous Pregnancy Procedures:  Ultrasound; NST    (2017

   08:50:Kenisha Lipscomb RN)

Current Pregnancy Procedures:  None    (2017 08:50:Kenisha Lipscomb RN)

History of Previous :  No    (2017 08:50:Kenisha Lipscomb RN)

History of Gestational Diabetes:  No    (2017 08:50:Kenisha Lipscomb RN)

History of PIH:  No    (2017 08:50:Kenisha Lipscomb RN)

History of Incompetent Cervix:  No    (2017 08:50:Kenisha Lipscomb RN)

History of Placenta Previa/Abrup:  No    (2017 08:50:Kenisha Lipscomb RN)

History of Macrosomia:  No    (2017 08:50:Kenisha Lipscomb RN)

History of IUGR:  No    (2017 08:50:Kenisha Lipscomb RN)

History of Postpartum Hemorrhage:  No    (2017 08:50:Kenisha Lipscomb RN)

History of Loss/Stillborn:  No    (2017 08:50:Kenisha Lipscomb RN)

History of  Death:  No    (2017 08:50:Kenisha Lipscomb RN)

History of D (Rh) Sensitization:  No    (2017 08:50:Kenisha Lipscomb RN)

History Recurrent Loss/Stillborn:  No    (2017 08:50:Kenisha Lipscomb RN)

History Depression/PP Depression:  No    (2017 08:50:Kenisha Lipscomb RN)

History of  Uterine Anomaly/PB:  No    (2017 08:50:Kenisha Lipscomb RN)

History of Infertility:  No    (2017 08:50:Kenisha Lipscomb RN)

History of  ART Treatment:  No    (2017 08:50:Kenisha Lipscomb RN)

History of PB:  No    (2017 08:50:Kenisha Lipscomb RN)

Comments Obstetrical History:  G1- ,Baby boy, 6 lb 9 oz, 

   G2- ,Baby girl, 7lb 10 oz, 

   G3-  Baby girl,8lbs, 2016, received cytotec 600mcg DE

   G4- current pregnancy, no prenatal care, PPPROM    (2017

   08:50:Florida Bowens RN)

   

=================================================================

MEDICAL HISTORY

=================================================================

   

Med Hx Diabetes:  No    (2017 08:50:Kenisha Lipscomb RN)

Med Hx Hypertension:  No    (2017 08:50:Kenisha Lipscomb RN)

Med Hx Heart Disease:  No    (2017 08:50:Kenisha Lipscomb RN)

Med Hx Autoimmune Disorder:  No    (2017 08:50:Kenisha Lipscomb RN)

Med Hx Kidney Disease/UTI:  No    (2017 08:50:Kenisha Lipscomb RN)

Med Hx Neurologic/Epilepsy:  No    (2017 08:50:Kenisha Lipscomb RN)

Med Hx Psychiatric Disorders:  No    (2017 08:50:Kenisha Lipscomb RN)

Med Hx Hepatitis/Liver Disease:  No    (2017 08:50:Kenisha Lipscomb RN)

Med Hx Varicosities/Phlebitis:  No    (2017 08:50:Kenisha Lipscomb RN)

Med Hx Thyroid Dysfunction:  No    (2017 08:50:Kenisha Lipscomb RN)

Med Hx Trauma/Violence:  No    (2017 08:50:Kenisha Lipscomb RN)

Med Hx Blood Transfusion:  No    (2017 08:50:Kenisha Lipscomb RN)

Med Hx Pulmonary (Asthma,TB):  No    (2017 08:50:Kenisha Lipscomb RN)

Med Hx Breast:  No    (2017 08:50:Kenisha Lipscomb RN)

Med Hx GYN Surgery:  No    (2017 08:50:Kenisha Lipscomb RN)

Med Hx Hospitalization/Surgery:  Yes    (2017 08:50:Florida Bowens RN)

Med Hx Anesthetic Complications:  No    (2017 08:50:Kenisha Lipscomb RN)

Med Hx Abnormal Pap Smear:  No    (2017 08:50:Kenisha Lipscomb RN)

Other Medical Diseases:  No    (2017 08:50:Kenisha Lipscomb RN)

Med Hx Significant Family Hx:  No    (2017 08:50:Kenisha Lipscomb RN)

Details of Med/Surg Hx:  childbirth x3, closed spaced pregnancies    

   (2017 08:50:Florida Bowens RN)

   

=================================================================

INFECTIOUS HISTORY

=================================================================

   

Inf Hx Gonorrhea:  No    (2017 08:50:Kenisha Lipscomb RN)

Inf Hx Chlamydia:  Yes    (2017 08:50:Florida Bowens RN)

Inf Hx Syphilis:  No    (2017 08:50:Kenisha Lipscomb RN)

Inf Hx HIV/AIDS:  No    (2017 08:50:Kenisha Lipscomb RN)

Inf Hx Human Papilloma Virus:  No    (2017 08:50:Kenisha Lipscomb RN)

Inf Hx Pt/Partner Genital Herpes:  No    (2017 08:50:Kenisha Lipscomb RN)

Inf Hx Tuberculosis/Exposure:  No    (2017 08:50:Kenisha Lipscomb RN)

Inf Hx Hepatitis B,C:  No    (2017 08:50:Kenisha Lipscomb RN)

Inf Hx Rash or Viral Illness:  No    (2017 08:50:Kenisha Lipscomb RN)

Details of Infectious Hx:  chlamydia     (2017 08:50:Florida Bowens RN)

   

=================================================================

GENETIC HISTORY

=================================================================

   

Gen Hx Age >=35 at KEMI:  No    (2017 08:50:Kenisha Lipscomb RN)

Gen Hx Thalassemia:  No    (2017 08:50:Kenisha Lipscomb RN)

Gen Hx Congenital Heart Defect:  No    (2017 08:50:Kenisha Lipscomb RN)

Gen Hx Neural Tube Defect:  No    (2017 08:50:Kenisha Lipscomb RN)

Gen Hx Down's Syndrome:  No    (2017 08:50:Kenisha Lipscomb RN)

Gen Hx Emeterio-Sachs:  No    (2017 08:50:Kenisha Lipscomb RN)

Gen Hx [place]:  No    (2017 08:50:Kenisha Lipscomb RN)

Gen Hx Familial Dysautonomia:  No    (2017 08:50:Kenisha Lipscomb RN)

Gen Hx Sickle Cell Disease/Trait:  No    (2017 08:50:Kenisha Lipscomb RN)

Gen Hx Hemophilia/Blood Disorder:  No    (2017 08:50:Kenisha Lipscomb RN)

Gen Hx Muscular Dystrophy:  No    (2017 08:50:Kenisha Lipscomb RN)

Gen Hx Cystic Fibrosis:  No    (2017 08:50:Kenisha Lipscomb RN)

Gen Hx Huntingtons Chorea:  No    (2017 08:50:Kenisha Lipscomb RN)

Gen Hx Mental Retardation/Autism:  No    (2017 08:50:Kenisha Lipscomb RN)

Gen Hx Tested for Fragile X:  No    (2017 08:50:Kenisha Lipscomb RN)

Gen Hx Other Inher/Chromosomal:  No    (2017 08:50:Kenisha Lipscomb RN)

Gen Hx Maternal Metabolic DO:  No    (2017 08:50:Kenisha Lipscomb RN)

Gen Hx Pt Father or FOB Defect:  No    (2017 08:50:Kenisha Lipscomb RN)

Gen Hx Other Genetic History:  No    (2017 08:50:Kenisha Lipscomb RN)

Gen Hx Drugs/Meds since LMP:  No    (2017 08:50:Kenisha Lipscomb RN)

## 2017-04-13 NOTE — L&D GENERAL ADMISSION
=================================================================

General Admit

=================================================================

Datetime Report Generated by CPN: 2017 06:00

   

   

=================================================================

PREGNANCY INFORMATION

=================================================================

   

Patient Age:  21    (2017 08:38:QS system process)

EDC:  2017 00:00    (2017 08:50:Carlyn Joshua)

:  4    (2017 08:50:Crystal Lone [place], RN)

Para:  3    (2017 08:50:Crystal Lone [place], RN)

Baby, Number in Womb:  1    (2017 08:50:Crystal Deisi, RN)

   

=================================================================

PRENATAL CARE

=================================================================

   

Adequate Prenatal Care:  No    (2017 08:50:Crystal Deisi,

   RN)

Height (in):  59    (2017 10:46:QS system process)

   

=================================================================

ALLERGIES

=================================================================

   

Medication Allergy:  No    (2017 08:50:Crystal Deisi, RN)

Medication Allergies:  No Known Allergies (2017)    (2017

   05:48:QS system process)

Latex Allergy:  No Latex Allergies    (2017 08:50:Crystal

   Lone Rock, RN)

   

=================================================================

COMMUNICATION

=================================================================

   

Primary Language:  English    (2017 08:50:Crystal Lone [place], RN)

Medical Tx Preferred Language:  English    (2017 08:50:Crystal

   Deisi, RN)

   

=================================================================

DEMOGRAPHICS

=================================================================

   

Address:  [address] [place] [address]   [id number]    (2017 08:38:QS system process)

Zipcode:  [id number]    (2017 08:38:QS system process)

Home Telephone:  [phone]    (2017 08:38:QS system process)

SSN:      (2017 08:38:QS system process)

Next of Kin Name:  CATHERINECONRAD RICH    (2017 08:38:QS system

   process)

Next of Kin Phone:  [phone]    (2017 08:38:QS system

   process)

Next of Kin Relationship:  FA    (2017 08:38:QS system process)

YOB: 1995    (2017 08:38:QS system process)

Marital Status:  Single    (2017 08:38:QS system process)

Sex:  Female    (2017 08:38:QS system process)

Race:  Other    (2017 08:38:QS system process)

Ethnicity:   or     (2017 08:38:QS system process)

Hoahaoism:  Zoroastrianism    (2017 08:38:QS system process)

   

=================================================================

DRUG AND ALCOHOL USE

=================================================================

   

Alcohol:  No    (2017 08:50:Crystal Lone [place], RN)

Cigarettes:  Never Smoker. [id number]    (2017 08:50:Kenisha Lipscomb RN)

Marijuana:  No    (2017 08:50:Kenisha Lipscomb RN)

Cocaine:  No    (2017 08:50:Kenisha Lipscomb RN)

Other Illicit Drugs:  No    (2017 08:50:Kenisha Lipscomb RN)

   

=================================================================

VACCINE HISTORY

=================================================================

   

Influenza Vaccine:  No    (2017 08:50:Kenisha Lipscomb RN)

Pneumococcal Vaccine:  No    (2017 08:50:Kenisha Lipscomb RN)

Tetanus Vaccine:  No    (2017 08:50:Kenisha Lipscomb RN)

Tdap Vaccine:  No    (2017 08:50:Kenisha Lipscomb RN)

Hepatitis B Vaccine:  No    (2017 08:50:Kenisha Lipscomb RN)

   

=================================================================



=================================================================

   

Pediatrician:  [facility]    (2017 08:50:Kenisha Lipscomb RN)

Feeding Preference:  Formula    (2017 08:50:Kenisha Lipscomb RN)

Benefit of Breast Feed Discussed:  Yes    (2017 08:50:Kenisha Lipscomb RN)

Circumcision:  unknown sex     (2017 08:50:Florida Bowens RN)

Prenatal Classes Attended:  No    (2017 08:50:Florida Bowens RN)

Tubal Ligation:  No    (2017 08:50:Kenisha Lipscomb RN)

Tubal Authorization Signed:  N/A    (2017 08:50:Kenisha Lipscomb RN)

 Consent:  N/A    (2017 08:50:Kenisha Lipscomb RN)

 Consent Signed:  N/A    (2017 08:50:Kenisha Lipscomb RN)

Pain Management Plans:  Epidural    (2017 08:50:Kenisha Lipscomb RN)

Plans for Labor and Delivery:  None    (2017 08:50:Kenisha Lipscomb RN)

Cultural/Spritual Practice:  No    (2017 08:50:Kenisha Lipscomb RN)

Spir/Cult Dietary Needs:  No    (2017 08:50:Kenisha Lipscomb RN)

   

=================================================================

LIVING SITUATION/DISCHARGE PLAN

=================================================================

   

Living Arrangements:  House    (2017 08:50:Kenisha Lipscomb RN)

Adequate Access to::  Electric; Heat; Refrigeration; Plumbing/Running

   water; Phone; Transportation    (2017 08:50:Kenisha Lipscomb RN)

WIC Program:  No    (2017 08:50:Kenisha Lipscomb RN)

Discharge  Person:  patient unsure    (2017 08:50:Florida Bowens RN)

Person to Help after Discharge:  patient unsure    (2017

   08:50:Florida Bowens RN)

Currently Using Commun Resources:  No    (2017 08:50:Kenisha Lipscomb RN)

Outside Agency/:  No    (2017 08:50:Kenisha Lipscomb RN)

Car Seat for Discharge:  Yes    (2017 08:50:Kenisha Lipscomb RN)

Adoption Requested:  Yes    (2017 08:50:Kenisha Lipscomb RN)

Agency/Agent Handling Adoption:  discharge planning has patient in

   touch with [employer] and patient has paperwork at bedside

   to review     (2017 08:50:Florida Bowens RN)

Pt Contact w/infant Post Birth:  N/A    (2017 08:50:Kenisha Lipscomb RN)

   

=================================================================

PRENATAL LABS

=================================================================

   

Blood Type:  O Positive    (2017 08:50:Kenisha Lipscomb RN)

Antibody Screen:  Negative    (2017 08:50:Kenisha Lipscomb RN)

Hemoglobin:  10.8 L    (2017 06:45:QS system process)

Hematocrit:  33.5 L    (2017 06:45:QS system process)

MCV:  75 L    (2017 06:45:QS system process)

Group Beta Strep:  1 NO GROUP B STREPTOCOCCUS RECOVERED    (2017

   09:20:QS system process)

Gonorrhea:  Negative    (2017 08:50:Kenisha Lipscomb RN)

Chlamydia:  Negative    (2017 08:50:Kenisha Lipscomb RN)

RPR/VDRL:  Nonreactive    (2017 08:50:Florida Bowens RN)

HIV Results:  NEGATIVE    (2017 09:26:QS system process)

Hepatitis B:  Negative    (2017 08:50:Florida Bowens RN)

Rubella:  POSITIVE

   NEGATIVE IF LESS THAN OR EQUAL TO 9.99 IU/mL

   POSITIVE IF GREATER THAN OR EQUAL TO 10.0 IU/mL    (2017

   09:26:QS system process)

Rubella Titer:  19.00    (2017 09:26:QS system process)

   

=================================================================

OB/PREVIOUS PREGNANCY HISTORY

=================================================================

   

Previous Pregnancy Procedures:  Ultrasound; NST    (2017

   08:50:Kenisha Lipscomb RN)

Current Pregnancy Procedures:  None    (2017 08:50:Kenisha Lipscomb RN)

History of Previous :  No    (2017 08:50:Kenisha Lipscomb RN)

History of Gestational Diabetes:  No    (2017 08:50:Kenisha Lipscomb RN)

History of PIH:  No    (2017 08:50:Kenisha Lipscomb RN)

History of Incompetent Cervix:  No    (2017 08:50:Kenisha Lipscomb RN)

History of Placenta Previa/Abrup:  No    (2017 08:50:Kenisha Lipscomb RN)

History of Macrosomia:  No    (2017 08:50:Kenisha Lipscomb RN)

History of IUGR:  No    (2017 08:50:Kenisha Lipscomb RN)

History of Postpartum Hemorrhage:  No    (2017 08:50:Kenisha Lipscomb RN)

History of Loss/Stillborn:  No    (2017 08:50:Kenisha Lipscomb RN)

History of  Death:  No    (2017 08:50:Kenisha Lipscomb RN)

History of D (Rh) Sensitization:  No    (2017 08:50:Kenisha Lipscomb RN)

History Recurrent Loss/Stillborn:  No    (2017 08:50:Kenisha Lipscomb RN)

History Depression/PP Depression:  No    (2017 08:50:Kenisha Lipscomb RN)

History of  Uterine Anomaly/PB:  No    (2017 08:50:Kenisha Lipscomb RN)

History of Infertility:  No    (2017 08:50:Kenisha Lipscomb RN)

History of  ART Treatment:  No    (2017 08:50:Kenisha Lipscomb RN)

History of PB:  No    (2017 08:50:Kenisha Lipscomb RN)

Comments Obstetrical History:  G1- ,Baby boy, 6 lb 9 oz, 

   G2- ,Baby girl, 7lb 10 oz, 

   G3-  Baby girl,8lbs, 2016, received cytotec 600mcg CO

   G4- current pregnancy, no prenatal care, PPPROM    (2017

   08:50:Florida Bowens RN)

   

=================================================================

MEDICAL HISTORY

=================================================================

   

Med Hx Diabetes:  No    (2017 08:50:Kenisha Lipscomb RN)

Med Hx Hypertension:  No    (2017 08:50:Kenisha Lipscomb RN)

Med Hx Heart Disease:  No    (2017 08:50:Kenisha Lipscomb RN)

Med Hx Autoimmune Disorder:  No    (2017 08:50:Kenisha Lipscomb RN)

Med Hx Kidney Disease/UTI:  No    (2017 08:50:Kenisha Lipscomb RN)

Med Hx Neurologic/Epilepsy:  No    (2017 08:50:Kenisha Lipscomb RN)

Med Hx Psychiatric Disorders:  No    (2017 08:50:Kenisha Lipscomb RN)

Med Hx Hepatitis/Liver Disease:  No    (2017 08:50:Kenisha Lipscomb RN)

Med Hx Varicosities/Phlebitis:  No    (2017 08:50:Kenisha Lipscomb RN)

Med Hx Thyroid Dysfunction:  No    (2017 08:50:Kenisha Lipscomb RN)

Med Hx Trauma/Violence:  No    (2017 08:50:Kenisha Lipscomb RN)

Med Hx Blood Transfusion:  No    (2017 08:50:Kenisha Lipscomb RN)

Med Hx Pulmonary (Asthma,TB):  No    (2017 08:50:Kenisha Lipscomb RN)

Med Hx Breast:  No    (2017 08:50:Kenisha Lipscomb RN)

Med Hx GYN Surgery:  No    (2017 08:50:Kenisha Lipscomb RN)

Med Hx Hospitalization/Surgery:  Yes    (2017 08:50:Florida Bowens RN)

Med Hx Anesthetic Complications:  No    (2017 08:50:Kenisha Lipscomb RN)

Med Hx Abnormal Pap Smear:  No    (2017 08:50:Kenisha Lipscomb RN)

Other Medical Diseases:  No    (2017 08:50:Kenisha Lipscomb RN)

Med Hx Significant Family Hx:  No    (2017 08:50:Kenisha Lipscomb RN)

Details of Med/Surg Hx:  childbirth x3, closed spaced pregnancies    

   (2017 08:50:Florida Bowens RN)

   

=================================================================

INFECTIOUS HISTORY

=================================================================

   

Inf Hx Gonorrhea:  No    (2017 08:50:Kenisha Lipscomb RN)

Inf Hx Chlamydia:  Yes    (2017 08:50:Florida Bowens RN)

Inf Hx Syphilis:  No    (2017 08:50:Kenisha Lipscomb RN)

Inf Hx HIV/AIDS:  No    (2017 08:50:Kenisha Lipscomb RN)

Inf Hx Human Papilloma Virus:  No    (2017 08:50:Kenisha Lipscomb RN)

Inf Hx Pt/Partner Genital Herpes:  No    (2017 08:50:Kenisha Lipscomb RN)

Inf Hx Tuberculosis/Exposure:  No    (2017 08:50:Kenisha Lipscomb RN)

Inf Hx Hepatitis B,C:  No    (2017 08:50:Kenisha Lipscomb RN)

Inf Hx Rash or Viral Illness:  No    (2017 08:50:Kenisha Lipscomb RN)

Details of Infectious Hx:  chlamydia     (2017 08:50:Florida Bowens RN)

   

=================================================================

GENETIC HISTORY

=================================================================

   

Gen Hx Age >=35 at KEMI:  No    (2017 08:50:Kenisha Lipscomb RN)

Gen Hx Thalassemia:  No    (2017 08:50:Kenisha Lipscomb RN)

Gen Hx Congenital Heart Defect:  No    (2017 08:50:Kenisha Lipscomb RN)

Gen Hx Neural Tube Defect:  No    (2017 08:50:Kenisha Lipscomb RN)

Gen Hx Down's Syndrome:  No    (2017 08:50:Kenisha Lipscomb RN)

Gen Hx Emeterio-Sachs:  No    (2017 08:50:Kenisha Lipscomb RN)

Gen Hx Canavan:  No    (2017 08:50:Kenisha Lipscomb RN)

Gen Hx Familial Dysautonomia:  No    (2017 08:50:Kenisha Lipscomb RN)

Gen Hx Sickle Cell Disease/Trait:  No    (2017 08:50:Kenisha Lipscomb RN)

Gen Hx Hemophilia/Blood Disorder:  No    (2017 08:50:Kenisha Lipscomb RN)

Gen Hx Muscular Dystrophy:  No    (2017 08:50:Kenisha Lipscomb RN)

Gen Hx Cystic Fibrosis:  No    (2017 08:50:Kenisha Lipscomb RN)

Gen Hx Huntingtons Chorea:  No    (2017 08:50:Kenisha Lipscomb RN)

Gen Hx Mental Retardation/Autism:  No    (2017 08:50:Kenisha Lipscomb RN)

Gen Hx Tested for Fragile X:  No    (2017 08:50:Kenisha Lipscomb RN)

Gen Hx Other Inher/Chromosomal:  No    (2017 08:50:Kenisha Lipscomb RN)

Gen Hx Maternal Metabolic DO:  No    (2017 08:50:Kenisha Lipscomb RN)

Gen Hx Pt Father or FOB Defect:  No    (2017 08:50:Kenisha Lipscomb RN)

Gen Hx Other Genetic History:  No    (2017 08:50:Kenisha Lipscomb RN)

Gen Hx Drugs/Meds since LMP:  No    (2017 08:50:Kenisha Lipscomb RN)

## 2017-04-13 NOTE — L&D CURRENT ADMISSION
=================================================================

Current Admit

=================================================================

Datetime Report Generated by CPN: 04/13/2017 06:00

   

   

=================================================================

ADMISSION INFORMATION

=================================================================

   

Current Admit Date/Time:  04/06/2017 08:52    (04/06/2017 08:25:Kenisha Lipscomb RN)

Reason for Admission:  Rupture of Membranes    (04/06/2017

   08:25:Kenisha Lipscomb RN)

Chief Complaint:  rapture of membranes    (04/06/2017 19:07:Bria Roldan RN)

EGA per Dates:  33.3    (04/06/2017 08:50:QS system process)

Method of Arrival:  Wheelchair    (04/06/2017 08:25:Kenisha Lipscomb RN)

Admitted From:  Antepartum Unit    (04/06/2017 08:25:Kenisha Lipscomb RN)

Reason for Induction:  Not Applicable    (04/06/2017 08:25:Kenisha Lipscomb RN)

Prenatal Records Available:  No    (04/06/2017 08:25:Kenisha Lipscomb RN)

General Admission Information:  Updated    (04/06/2017 08:25:Kenisha Lipscomb RN)

   

=================================================================

BELONGINGS/ADVANCED DIRECTIVES

=================================================================

   

Valuables/Personal Effects:  Cell Phone    (04/06/2017 08:25:Kenisha Lipscomb RN)

Other Belongings:  see valuable consent     (04/06/2017 08:25:Florida Bowens RN)

Disposition of Belongings:  Kept with Patient    (04/06/2017

   08:25:Kenisha Lipscomb RN)

Advance Direct for Healthcare:  No, but Requests Information   

   (04/06/2017 08:25:Kenisha Lipscomb RN)

Durable Power of :  No    (04/06/2017 08:25:Kenisha Lipscomb RN)

Living Will:  No    (04/06/2017 08:25:Kenisha Lipscomb RN)

Organ Donor:  Yes    (04/06/2017 08:25:Kenisha Lipscomb RN)

Pt Rights Information Given:  Yes    (04/06/2017 08:25:Kenisha Lipscomb RN)

Pt Understands Pt Rights:  Yes    (04/06/2017 08:25:Kenisha Lipscomb RN)

   

=================================================================

LEARNING ASSESSMENT

=================================================================

   

Knowledge Level:  Understands L_D Process; Understands Care Activities;

   Had Pre-Hospital Education; Understands Diagnosis    (04/06/2017

   08:25:Kenisha Lipscomb RN)

Barriers to Learning:  None    (04/06/2017 08:25:Kenisha Lipscomb RN)

Learning Readiness:  Motivated    (04/06/2017 08:25:Kenisha Lipscomb RN)

Learns Best By:  1 to 1 Instruction    (04/06/2017 08:25:Kenisha Lipscomb RN)

Learning Needs:  Labor and Delivery Process; Pain Management; Symptoms

   to Report; Treatment Plan; Medication; Diagnosis; Nutrition;

   Equipment; Infant Care; Community Resources    (04/06/2017

   08:25:Kenisha Lipscomb RN)

   

=================================================================

DOMESTIC VIOLANCE SCREENING

=================================================================

   

Dom Viol Threatened/Hurt:  No    (04/06/2017 08:25:Kenisha Lipscomb RN)

Hx of Abuse/Neglect past 2yrs:  No    (04/06/2017 08:25:Kenisha Lipscomb RN)

Feel Unsafe Going Home:  No    (04/06/2017 08:25:Kenisha Lipscomb RN)

Addt'l Observ Indicating Abuse:  No    (04/06/2017 08:25:Kenisha Lipscomb RN)

Reason Unable to Complete Screen:  N/A, Screen Completed    (04/06/2017

   08:25:Kenisha Lipscomb RN)

Considered Personal Harm/Suicide:  No    (04/06/2017 08:25:Kenisha Lipscomb RN)

   

=================================================================

NUTRITIONAL/FUNCTIONAL SCREENING

=================================================================

   

Problem with Appetite >5 Days:  No    (04/06/2017 08:25:Kenisha Lipscomb RN)

Chew/Swallow Difficulties:  No    (04/06/2017 08:25:Kenisha Lipscomb RN)

Inappropriate Wt Gain/Loss:  No    (04/06/2017 08:25:Kenisha Lipscomb RN)

Presence Skin Breakdown/Ulcer:  No    (04/06/2017 08:25:Kenisha Lipscomb RN)

Special Diet:  No    (04/06/2017 08:25:Kenisha Lipscomb RN)

Pt Requests Dietician Visit:  No    (04/06/2017 08:25:Kenisha Lipscomb RN)

Hx of Any of the Following?:  N/A    (04/06/2017 08:25:Kenisha Lipscomb RN)

New Diagnosis of:  N/A    (04/06/2017 08:25:Kenisha Lipscomb RN)

Requires Assist w/Ambulation:  No    (04/06/2017 08:25:Kenisha Lipscomb RN)

Uses Assist Device to Ambulate:  No    (04/06/2017 08:25:Kenisha Lipscomb RN)

Pt Requires Help w/ADL's:  No    (04/06/2017 08:25:Kenisha Lipscomb RN)

## 2017-09-15 NOTE — ER DOCUMENT REPORT
ED Eye Complaint





- General


Chief Complaint: Eye Problem


Stated Complaint: EYE PAIN


Time Seen by Provider: 09/15/17 17:12


Mode of Arrival: Ambulatory


Information source: Patient


Notes: 





22-year-old female presents to ED for right eye swelling.  She states she 

started having pain from pinkeye with redness to the eye on Saturday.  She went 

to the store and got over-the-counter drops on Sunday and started using them.  

Her eye became red and draining on Sundays.  She went to the urgent care today 

they told her that her eye was too infected for them treat her and sent her to 

the emergency room.  She states she called her ophthalmologist on Monday 

because she had an eye appointment and she told them she had the pinkeye and 

they told her to reschedule her eye appointment for next week Tuesday.


TRAVEL OUTSIDE OF THE [place] IN LAST 30 DAYS: No





- HPI


Onset: Other - Sunday


Eye location: Right


Injury: No


Quality of pain: Burning


Severity: Mild


Pain Level: 2


Exposure: Conjunctivitis


Contact lenses worn: Yes - She states she took her contac


Associated symptoms: Burning, Pain





- Related Data


Allergies/Adverse Reactions: 


 





No Known Allergies Allergy (Verified 04/08/17 05:48)


 











Past Medical History





- General


Information source: Patient





- Social History


Smoking Status: Never Smoker


Cigarette use (# per day): No


Chew tobacco use (# tins/day): No


Smoking Education Provided: No


Frequency of alcohol use: None


Drug Abuse: None


Lives with: Family


Family History: CAD, DM, Hyperlipidemia, Hypertension, Thyroid Disfunction.  

denies: Arthritis, COPD, CVA, Malignancy


Patient has suicidal ideation: No


Patient has homicidal ideation: No





- Past Medical History


Cardiac Medical History: Reports: None


Pulmonary Medical History: Reports: None


EENT Medical History: Reports: None


Neurological Medical History: Reports: None


Endocrine Medical History: Reports: None


Renal/ Medical History: Reports: None


Malignancy Medical History: Reports: None


GI Medical History: Reports: None


Musculoskeltal Medical History: Reports None


Skin Medical History: Reports None


Psychiatric Medical History: Reports: None


Traumatic Medical History: Reports: None


Infectious Medical History: Reports: None


Surgical Hx: Negative


Past Surgical History: Reports: None





- Immunizations


Immunizations up to date: Yes


Hx Diphtheria, Pertussis, Tetanus Vaccination: Yes





Review of Systems





- Review of Systems


Constitutional: No symptoms reported


EENT: Eye pain, Eye discharge, Blurred vision


Cardiovascular: No symptoms reported


Respiratory: No symptoms reported


Gastrointestinal: No symptoms reported


Genitourinary: No symptoms reported


Female Genitourinary: No symptoms reported


Musculoskeletal: No symptoms reported


Skin: No symptoms reported


Hematologic/Lymphatic: No symptoms reported


Neurological/Psychological: No symptoms reported


-: Yes All other systems reviewed and negative





Physical Exam





- Vital signs


Vitals: 


 











Temp Pulse Resp BP Pulse Ox


 


 98.6 F   79   16   127/74 H  97 


 


 09/15/17 16:57  09/15/17 16:57  09/15/17 16:57  09/15/17 16:57  09/15/17 16:57











Interpretation: Normal





- General


General appearance: Appears well, Alert





- HEENT


Head: Normocephalic, Atraumatic


Eyes: Normal


Conjunctiva: Injected, Purulent discharge, Other - swelling


Cornea: Normal.  No: Flourescein stain uptake


Extraocular movements intact: Yes


Eyelashes: Matted


Pupils: PERRL


Visual acuity- Right eye: 20/100


Visual acuity- Left eye: 29/20


Corrective lenses worn: Yes


Right intraocular pressure: 18.95


Fundascopic: Normal


Ears: Normal


External canal: Normal


Tympanic membrane: Normal


Sinus: Normal


Nasal: Normal


Mouth/Lips: Normal


Pharynx: Normal


Neck: Normal





- Respiratory


Respiratory status: No respiratory distress


Chest status: Nontender


Breath sounds: Normal


Chest palpation: Normal





- Cardiovascular


Rhythm: Regular


Heart sounds: Normal auscultation


Murmur: No





- Abdominal


Inspection: Normal


Distension: No distension


Bowel sounds: Normal


Tenderness: Nontender


Organomegaly: No organomegaly





- Back


Back: Normal, Nontender





- Extremities


General upper extremity: Normal inspection, Nontender, Normal color, Normal ROM

, Normal temperature


General lower extremity: Normal inspection, Nontender, Normal color, Normal ROM

, Normal temperature, Normal weight bearing.  No: Bee's sign





- Neurological


Neuro grossly intact: Yes


Cognition: Normal


Orientation: AAOx4


[place] Coma Scale Eye Opening: Spontaneous


[place] Coma Scale Verbal: Oriented


[place] Coma Scale Motor: Obeys Commands


[place] Coma Scale Total: 15


Speech: Normal


Motor strength normal: LUE, RUE, LLE, RLE


Sensory: Normal





- Psychological


Associated symptoms: Normal affect, Normal mood





- Skin


Skin Temperature: Warm


Skin Moisture: Dry


Skin Color: Normal





Course





- Re-evaluation


Re-evalutation: 





09/15/17 20:36


Consulted Dr. Patterson to examine the eye.  She recommended treating the 

patient with Rocephin IM and ofloxacin eyedrops and have patient follow-up with 

her ophthalmologist on Monday.  Patient was given Rocephin and given a 

prescription for the ofloxacin drops and discharged home.  Patient was given 

instructions to please throw away all contact solutions containers and any use 

contacts and start new after the infection is cleared.





- Vital Signs


Vital signs: 


 











Temp Pulse Resp BP Pulse Ox


 


 98.6 F   79   16   127/74 H  97 


 


 09/15/17 16:57  09/15/17 16:57  09/15/17 16:57  09/15/17 16:57  09/15/17 16:57














Discharge





- Discharge


Clinical Impression: 


Conjunctivitis, right eye


Qualifiers:


 Conjunctivitis type: unspecified Qualified Code(s): H10.9 - Unspecified 

conjunctivitis





Condition: Stable


Disposition: HOME, SELF-CARE


Additional Instructions: 


CONJUNCTIVITIS:





     You have an infection in your eye, commonly known as "pink eye." 

Conjunctivitis causes redness, mild discomfort, itching, and mattering on the 

eyelids.  It is very contagious, so you must be careful to wash your hands 

after touching your face so you don't pass the infection on to others.


     Conjunctivitis is caused by both viruses and bacteria.  It usually 

responds quickly to treatment with antibiotic drops.  These should be placed in 

the eye as prescribed (usually every three to four hours while you're awake).  

If you wear contact lenses, don't put them in your eyes until the infection is 

cleared and you are no longer using the drops (unless your doctor advises you 

otherwise).


     Should you develop increasing eye pain, severe swelling, decreased vision, 

or fail to improve as expected, please return for re-examination.








EYEDROP USE:


     Eyedrops are most easily applied by pulling down on the cheek just below 

the lower eyelid.  The lower lid will pop out to form a pouch into which you 

can drop the medicine.


     A small brief sting is not unusual, especially if the eye is reddened and 

irritated already.


     Use the drops exactly as recommended.  You should see the doctor at once 

if there is a decrease in vision, swelling of the eye, or an increase in 

discomfort.








ANTIBIOTIC THERAPY:


     You have been given an antibiotic prescription.  It's important that you 

take all the medication, unless instructed otherwise by your physician.  

Failure to complete the entire course can result in relapse of your condition.


     Common side effects of antibiotics include nausea, intestinal cramping, or 

diarrhea.  Women may develop vaginal yeast infections, and babies can get yeast 

(thrush) in the mouth following the use of antibiotics.  Contact your physician 

if you develop significant side effects from this medication.


     Allergy to this antibiotic can result in hives, wheezing, faintness, or 

itching.  If symptoms of allergy occur, stop the medication and call the doctor.





Rocephin





     You have been given an injection of an antibiotic called Rocephin (

ceftriaxone).  Sometimes the injection must be combined with antibiotic pills.  

For some infections, such as an uncomplicated ear infection, Rocephin provides 

all the antibiotic that's needed.


     The antibiotic will be in your body for about two days.  For serious 

infections, we usually repeat doses of Rocephin daily.


     Side effects are very unusual following a shot.  Women may develop vaginal 

yeast infections, and babies can get yeast (thrush) in the mouth following the 

use of antibiotics.  Contact your physician if you have symptoms with this 

medication.


     Allergy to this antibiotic can result in hives, wheezing, faintness, or 

itching.  If symptoms of allergy occur, call the doctor at once.








FOLLOW-UP CARE:


If you have been referred to a physician for follow-up care, call the physician

s office for an appointment as you were instructed or within the next two days.

  If you experience worsening or a significant change in your symptoms, notify 

the physician immediately or return to the Emergency Department at any time for 

reevaluation.





Call your ophthalmologist on Monday and explained that you have a severe 

conjunctivitis and did she need to be seen on Monday.  Use drops as per 

prescription.





Prescriptions: 


Ofloxacin 1 - 2 drop RT_EYE Q3 #1 bottle


Forms:  Elevated Blood Pressure

## 2018-08-25 ENCOUNTER — HOSPITAL ENCOUNTER (EMERGENCY)
Dept: HOSPITAL 62 - ER | Age: 23
LOS: 1 days | Discharge: HOME | End: 2018-08-26
Payer: COMMERCIAL

## 2018-08-25 DIAGNOSIS — Z3A.08: ICD-10-CM

## 2018-08-25 DIAGNOSIS — R07.9: ICD-10-CM

## 2018-08-25 DIAGNOSIS — O26.91: Primary | ICD-10-CM

## 2018-08-25 PROCEDURE — 80053 COMPREHEN METABOLIC PANEL: CPT

## 2018-08-25 PROCEDURE — 85025 COMPLETE CBC W/AUTO DIFF WBC: CPT

## 2018-08-25 PROCEDURE — 99285 EMERGENCY DEPT VISIT HI MDM: CPT

## 2018-08-25 PROCEDURE — 36415 COLL VENOUS BLD VENIPUNCTURE: CPT

## 2018-08-25 PROCEDURE — 84484 ASSAY OF TROPONIN QUANT: CPT

## 2018-08-25 PROCEDURE — 93005 ELECTROCARDIOGRAM TRACING: CPT

## 2018-08-25 PROCEDURE — 71045 X-RAY EXAM CHEST 1 VIEW: CPT

## 2018-08-25 PROCEDURE — 93010 ELECTROCARDIOGRAM REPORT: CPT

## 2018-08-25 PROCEDURE — 81025 URINE PREGNANCY TEST: CPT

## 2018-08-25 PROCEDURE — 83690 ASSAY OF LIPASE: CPT

## 2018-08-25 PROCEDURE — 81001 URINALYSIS AUTO W/SCOPE: CPT

## 2018-08-26 VITALS — SYSTOLIC BLOOD PRESSURE: 119 MMHG | DIASTOLIC BLOOD PRESSURE: 72 MMHG

## 2018-08-26 LAB
ADD MANUAL DIFF: NO
ALBUMIN SERPL-MCNC: 3.9 G/DL (ref 3.5–5)
ALP SERPL-CCNC: 63 U/L (ref 38–126)
ALT SERPL-CCNC: 29 U/L (ref 9–52)
ANION GAP SERPL CALC-SCNC: 12 MMOL/L (ref 5–19)
APPEARANCE UR: (no result)
APTT PPP: YELLOW S
AST SERPL-CCNC: 22 U/L (ref 14–36)
BASOPHILS # BLD AUTO: 0 10^3/UL (ref 0–0.2)
BASOPHILS NFR BLD AUTO: 0.5 % (ref 0–2)
BILIRUB DIRECT SERPL-MCNC: 0.3 MG/DL (ref 0–0.4)
BILIRUB SERPL-MCNC: 0.3 MG/DL (ref 0.2–1.3)
BILIRUB UR QL STRIP: NEGATIVE
BUN SERPL-MCNC: 6 MG/DL (ref 7–20)
CALCIUM: 9.6 MG/DL (ref 8.4–10.2)
CHLORIDE SERPL-SCNC: 105 MMOL/L (ref 98–107)
CO2 SERPL-SCNC: 21 MMOL/L (ref 22–30)
EOSINOPHIL # BLD AUTO: 0.4 10^3/UL (ref 0–0.6)
EOSINOPHIL NFR BLD AUTO: 4.7 % (ref 0–6)
ERYTHROCYTE [DISTWIDTH] IN BLOOD BY AUTOMATED COUNT: 13.4 % (ref 11.5–14)
GLUCOSE SERPL-MCNC: 86 MG/DL (ref 75–110)
GLUCOSE UR STRIP-MCNC: NEGATIVE MG/DL
HCT VFR BLD CALC: 39.9 % (ref 36–47)
HGB BLD-MCNC: 13.5 G/DL (ref 12–15.5)
KETONES UR STRIP-MCNC: (no result) MG/DL
LIPASE SERPL-CCNC: 68.4 U/L (ref 23–300)
LYMPHOCYTES # BLD AUTO: 2.1 10^3/UL (ref 0.5–4.7)
LYMPHOCYTES NFR BLD AUTO: 22.7 % (ref 13–45)
MCH RBC QN AUTO: 28.4 PG (ref 27–33.4)
MCHC RBC AUTO-ENTMCNC: 33.7 G/DL (ref 32–36)
MCV RBC AUTO: 84 FL (ref 80–97)
MONOCYTES # BLD AUTO: 0.7 10^3/UL (ref 0.1–1.4)
MONOCYTES NFR BLD AUTO: 7.1 % (ref 3–13)
NEUTROPHILS # BLD AUTO: 6.1 10^3/UL (ref 1.7–8.2)
NEUTS SEG NFR BLD AUTO: 65 % (ref 42–78)
NITRITE UR QL STRIP: NEGATIVE
PH UR STRIP: 5 [PH] (ref 5–9)
PLATELET # BLD: 325 10^3/UL (ref 150–450)
POTASSIUM SERPL-SCNC: 4.3 MMOL/L (ref 3.6–5)
PROT SERPL-MCNC: 7.3 G/DL (ref 6.3–8.2)
PROT UR STRIP-MCNC: NEGATIVE MG/DL
RBC # BLD AUTO: 4.75 10^6/UL (ref 3.72–5.28)
SODIUM SERPL-SCNC: 137.9 MMOL/L (ref 137–145)
SP GR UR STRIP: 1.02
TOTAL CELLS COUNTED % (AUTO): 100 %
UROBILINOGEN UR-MCNC: 4 MG/DL (ref ?–2)
WBC # BLD AUTO: 9.4 10^3/UL (ref 4–10.5)

## 2018-08-26 NOTE — ER DOCUMENT REPORT
ED General





- General


Chief Complaint: Chest Pain


Stated Complaint: CHEST PAIN


Time Seen by Provider: 18 00:20


Mode of Arrival: Ambulatory


Notes: 





Patient is a 23-year-old female  at 8 weeks by LMP who presents with 

intermittent chest pain for the past 36 hours.  The patient describes the chest 

pain as a squeezing, throbbing pain to her central and left side of her chest.  

States the pain comes on spontaneously and also resolved spontaneously.  

Nothing improves or worsens the pain when present.  She states that she has a 

remote history of similar symptoms during her previous pregnancy which was 

attributed to indigestion.  She denies any associated nausea, vomiting or 

shortness of breath.  She denies any symptoms currently.  Denies any pleuritic 

pain.  She has she states that she tries to breathe as much as she can during 

the events that this seems to help.  She denies any unilateral leg swelling.  

No history of DVT or pulmonary embolus.  She has not seen her general physician 

regarding today's concerns.


TRAVEL OUTSIDE OF THE [place] IN LAST 30 DAYS: No





- Related Data


Allergies/Adverse Reactions: 


 





No Known Allergies Allergy (Verified 17 05:48)


 











Past Medical History





- General


Information source: Patient





- Social History


Smoking Status: Never Smoker


Frequency of alcohol use: None


Drug Abuse: None


Lives with: Family


Family History: CAD, DM, Hyperlipidemia, Hypertension, Thyroid Disfunction.  

denies: Arthritis, COPD, CVA, Malignancy


Renal/ Medical History: Denies: Hx Peritoneal Dialysis





- Immunizations


Immunizations up to date: Yes


Hx Diphtheria, Pertussis, Tetanus Vaccination: Yes





Review of Systems





- Review of Systems


Notes: 





Constitutional: Negative for fever.


HENT: Negative for sore throat.


Eyes: Negative for visual changes.


Cardiovascular: Positive for chest pain.


Respiratory: Negative for shortness of breath.


Gastrointestinal: Negative for abdominal pain, vomiting or diarrhea.


Genitourinary: Negative for dysuria.


Musculoskeletal: Negative for back pain.


Skin: Negative for rash.


Neurological: Negative for headaches, weakness or numbness.





10 point ROS negative except as marked above and in HPI.





Physical Exam





- Vital signs


Vitals: 


 











Temp Pulse Resp BP Pulse Ox


 


 98.9 F   99   16   129/70 H  100 


 


 18 22:15  18 22:15  18 22:15  18 22:15  18 22:15











Interpretation: Normal


Notes: 





PHYSICAL EXAMINATION:





GENERAL: Well-appearing, well-nourished and in no acute distress.





HEAD: Atraumatic, normocephalic.





EYES: Pupils equal round and reactive to light, extraocular movements intact, 

sclera anicteric, conjunctiva are normal.





ENT: nares patent, oropharynx clear without exudates.  Moist mucous membranes.





NECK: Normal range of motion, supple without lymphadenopathy





LUNGS: Breath sounds clear to auscultation bilaterally and equal.  No wheezes 

rales or rhonchi.





HEART: Regular rate and rhythm without murmurs





ABDOMEN: Soft, nontender, normoactive bowel sounds.  No guarding, no rebound.  

No masses appreciated.





EXTREMITIES: Normal range of motion, no pitting or edema.  No cyanosis.





NEUROLOGICAL: No focal neurological deficits. Moves all extremities 

spontaneously and on command.





PSYCH: Normal mood, normal affect.





SKIN: Warm, Dry, normal turgor, no rashes or lesions noted.





Course





- Re-evaluation


Re-evalutation: 





18 02:11


Presentation of chest pain in an otherwise well appearing patient. Low clinical 

suspicion for ACS given clinical history, exam, EKG without ST elevations or 

depressions, and negative initial troponin. HEART score less than or equal to 

3. PE also seems unlikely given clinical history, absence of tachycardia or 

dyspnea.  Although patient is pregnant she is in her first trimester only 

minimal elevated risk.  We have discussed the very low probability of pulmonary 

embolus as well as the diagnostic test that would be necessary at this point 

being a CT of the chest and the risk of radiation exposure to both her and her.

  The patient has elected to avoid the study at this point which I think is 

very reasonable given her absence of shortness of breath, tachycardia, the 

intermittent nature of her discomfort.  CXR without evidence of pneumothorax or 

pneumonia. No widened mediastinum. Aortic dissection also seems unlikely given 

history, symmetric pulses, CXR, and vitals.  At this time will discharge with 

return precautions and follow-up recommendations.  Verbal discharge 

instructions given a the bedside and opportunity for questions given. 

Medication warnings reviewed. Patient is in agreement with this plan and has 

verbalized understanding of return precautions and the need for primary care 

follow-up in the next 24-72 hours.





- Vital Signs


Vital signs: 


 











Temp Pulse Resp BP Pulse Ox


 


 98.9 F   99   16   129/70 H  100 


 


 18 22:15  18 22:15  18 22:15  18 22:15  18 22:15














- Laboratory


Result Diagrams: 


 18 00:30





 18 00:30


Laboratory results interpreted by me: 


 











  18





  00:30 00:30


 


Carbon Dioxide  21 L 


 


BUN  6 L 


 


Creatinine  0.45 L 


 


Urine Ketones   TRACE H


 


Urine Urobilinogen   4.0 H


 


Ur Leukocyte Esterase   MODERATE H


 


Urine HCG, Qual   POSITIVE H














- Diagnostic Test


Radiology reviewed: Image reviewed, Reports reviewed


Radiology results interpreted by me: 





18 02:12


Chest x-ray: No acute infiltrate or pneumothorax





- EKG Interpretation by Me


Additional EKG results interpreted by me: 





18 02:12


Sinus rhythm.  Rate 95.  No ST elevations or depressions.  QTC is 413.





Discharge





- Discharge


Clinical Impression: 


 Intermittent left-sided chest pain





Pregnancy


Qualifiers:


 Weeks of gestation: unspecified Qualified Code(s): Z34.90 - Encounter for 

supervision of normal pregnancy, unspecified, unspecified trimester





Condition: Good


Disposition: HOME, SELF-CARE


Additional Instructions: 


You were seen today for chest pain.  The exact cause of your pain is unclear. 

However, based on your cardiac enzyme testing, chest x-ray, and EKG it does not 

appear that it is from an immediately life-threatening cause at this time.  

Please return to emergency department immediately if you have worsening of your 

chest pain, shortness of breath, vomiting, become unable to exert yourself due 

to pain or difficulty breathing, you pass out, or have any pain that radiates 

into your arms, jaw, or back. Please also return if you have any additional 

symptoms that are concerning to you.





You may take Tylenol 975 mg every 6 hours as needed for chest discomfort.  You 

may also take famotidine 20 mg twice daily as this could be indigestion.


Referrals: 


ALLISON BECERRA MD [Primary Care Provider] - Follow up as needed

## 2018-08-26 NOTE — ER DOCUMENT REPORT
ED Medical Screen (RME)





- General


Chief Complaint: Chest Pain


Stated Complaint: CHEST PAIN


Time Seen by Provider: 08/26/18 00:20


Mode of Arrival: Ambulatory


Information source: Patient


Notes: 





23-year-old female presents to ED for complaint of chest pain for about 24 

hours.  She states that the pain gets pretty bad at time and tells she feels 

like she needs to burst.  She states she has had this pain once before they 

told her they thought it was epigastric pain or indigestion.  She states she 

never followed up with the pain and has not had any follow-up treatment since 

then.  They said the last of her what they called a GI cocktail and she had 

relief.  She states she been taking Tums all day and that has not helped with 

the pain.  She states the pain is straight down the center of her chest.  

Patient does have active bowel sounds abdomen soft complains of some epigastric 

tenderness lungs are clear no tenderness to palpation.  Patient states she is 8-

10 weeks pregnant.





I have greeted and performed a rapid initial assessment of this patient.  A 

comprehensive ED assessment and evaluation of the patient, analysis of test 

results and completion of medical decision making process will be conducted by 

an additional ED providers.


TRAVEL OUTSIDE OF THE [place] IN LAST 30 DAYS: No





- Related Data


Allergies/Adverse Reactions: 


 





No Known Allergies Allergy (Verified 04/08/17 05:48)


 











Past Medical History





- Social History


Family history: Reviewed & Not Pertinent


Renal/ Medical History: Denies: Hx Peritoneal Dialysis





- Immunizations


Immunizations up to date: Yes


Hx Diphtheria, Pertussis, Tetanus Vaccination: Yes





Physical Exam





- Vital signs


Vitals: 





 











Temp Pulse Resp BP Pulse Ox


 


 98.9 F   99   16   129/70 H  100 


 


 08/25/18 22:15  08/25/18 22:15  08/25/18 22:15  08/25/18 22:15  08/25/18 22:15














Course





- Vital Signs


Vital signs: 





 











Temp Pulse Resp BP Pulse Ox


 


 98.9 F   99   16   129/70 H  100 


 


 08/25/18 22:15  08/25/18 22:15  08/25/18 22:15  08/25/18 22:15  08/25/18 22:15

## 2018-08-26 NOTE — RADIOLOGY REPORT (SQ)
EXAM DESCRIPTION: X-ray single view chest



CLINICAL HISTORY: 23 years Female, cp



COMPARISON: None.



TECHNIQUE: Single portable view of the chest performed on

8/26/2018 at 1:01 AM 



FINDINGS: 

The lungs are well expanded and are clear. There is no evidence

of a pneumothorax.



The cardiac silhouette is normal in size and configuration.



The mediastinal contours are normal.



No acute osseous abnormality is identified.



No focal soft tissue abnormalities are seen.







IMPRESSION:

No evidence of acute intrathoracic disease.

## 2020-09-04 ENCOUNTER — HOSPITAL ENCOUNTER (INPATIENT)
Dept: HOSPITAL 62 - LR | Age: 25
LOS: 2 days | Discharge: HOME | End: 2020-09-06
Attending: OBSTETRICS & GYNECOLOGY | Admitting: OBSTETRICS & GYNECOLOGY
Payer: MEDICAID

## 2020-09-04 DIAGNOSIS — O48.0: Primary | ICD-10-CM

## 2020-09-04 DIAGNOSIS — Z3A.41: ICD-10-CM

## 2020-09-04 LAB
ADD MANUAL DIFF: NO
APPEARANCE UR: (no result)
APTT PPP: YELLOW S
BARBITURATES UR QL SCN: NEGATIVE
BASOPHILS # BLD AUTO: 0 10^3/UL (ref 0–0.2)
BASOPHILS NFR BLD AUTO: 0.8 % (ref 0–2)
BILIRUB UR QL STRIP: NEGATIVE
EOSINOPHIL # BLD AUTO: 0.1 10^3/UL (ref 0–0.6)
EOSINOPHIL NFR BLD AUTO: 1.5 % (ref 0–6)
ERYTHROCYTE [DISTWIDTH] IN BLOOD BY AUTOMATED COUNT: 16.1 % (ref 11.5–14)
GLUCOSE UR STRIP-MCNC: NEGATIVE MG/DL
HCT VFR BLD CALC: 32.2 % (ref 36–47)
HGB BLD-MCNC: 10.7 G/DL (ref 12–15.5)
KETONES UR STRIP-MCNC: (no result) MG/DL
LYMPHOCYTES # BLD AUTO: 1.9 10^3/UL (ref 0.5–4.7)
LYMPHOCYTES NFR BLD AUTO: 33.3 % (ref 13–45)
MCH RBC QN AUTO: 25 PG (ref 27–33.4)
MCHC RBC AUTO-ENTMCNC: 33.3 G/DL (ref 32–36)
MCV RBC AUTO: 75 FL (ref 80–97)
METHADONE UR QL SCN: NEGATIVE
MONOCYTES # BLD AUTO: 0.5 10^3/UL (ref 0.1–1.4)
MONOCYTES NFR BLD AUTO: 9.3 % (ref 3–13)
NEUTROPHILS # BLD AUTO: 3.2 10^3/UL (ref 1.7–8.2)
NEUTS SEG NFR BLD AUTO: 55.1 % (ref 42–78)
NITRITE UR QL STRIP: NEGATIVE
PCP UR QL SCN: NEGATIVE
PH UR STRIP: 5 [PH] (ref 5–9)
PLATELET # BLD: 297 10^3/UL (ref 150–450)
PROT UR STRIP-MCNC: 30 MG/DL
RBC # BLD AUTO: 4.29 10^6/UL (ref 3.72–5.28)
SP GR UR STRIP: 1.02
TOTAL CELLS COUNTED % (AUTO): 100 %
URINE AMPHETAMINES SCREEN: NEGATIVE
URINE BENZODIAZEPINES SCREEN: NEGATIVE
URINE COCAINE SCREEN: NEGATIVE
URINE MARIJUANA (THC) SCREEN: NEGATIVE
UROBILINOGEN UR-MCNC: 2 MG/DL (ref ?–2)
WBC # BLD AUTO: 5.7 10^3/UL (ref 4–10.5)

## 2020-09-04 PROCEDURE — 88307 TISSUE EXAM BY PATHOLOGIST: CPT

## 2020-09-04 PROCEDURE — 86592 SYPHILIS TEST NON-TREP QUAL: CPT

## 2020-09-04 PROCEDURE — 10907ZC DRAINAGE OF AMNIOTIC FLUID, THERAPEUTIC FROM PRODUCTS OF CONCEPTION, VIA NATURAL OR ARTIFICIAL OPENING: ICD-10-PCS | Performed by: ADVANCED PRACTICE MIDWIFE

## 2020-09-04 PROCEDURE — 85025 COMPLETE CBC W/AUTO DIFF WBC: CPT

## 2020-09-04 PROCEDURE — 81001 URINALYSIS AUTO W/SCOPE: CPT

## 2020-09-04 PROCEDURE — 36415 COLL VENOUS BLD VENIPUNCTURE: CPT

## 2020-09-04 PROCEDURE — 86901 BLOOD TYPING SEROLOGIC RH(D): CPT

## 2020-09-04 PROCEDURE — 0KQM0ZZ REPAIR PERINEUM MUSCLE, OPEN APPROACH: ICD-10-PCS | Performed by: ADVANCED PRACTICE MIDWIFE

## 2020-09-04 PROCEDURE — 86850 RBC ANTIBODY SCREEN: CPT

## 2020-09-04 PROCEDURE — 86900 BLOOD TYPING SEROLOGIC ABO: CPT

## 2020-09-04 PROCEDURE — 80307 DRUG TEST PRSMV CHEM ANLYZR: CPT

## 2020-09-04 PROCEDURE — 85027 COMPLETE CBC AUTOMATED: CPT

## 2020-09-04 PROCEDURE — 3E033VJ INTRODUCTION OF OTHER HORMONE INTO PERIPHERAL VEIN, PERCUTANEOUS APPROACH: ICD-10-PCS | Performed by: ADVANCED PRACTICE MIDWIFE

## 2020-09-04 RX ADMIN — ACETAMINOPHEN AND CODEINE PHOSPHATE PRN EACH: 300; 30 TABLET ORAL at 17:35

## 2020-09-04 RX ADMIN — FERROUS SULFATE TAB 325 MG (65 MG ELEMENTAL FE) SCH MG: 325 (65 FE) TAB at 18:58

## 2020-09-04 RX ADMIN — ACETAMINOPHEN AND CODEINE PHOSPHATE PRN EACH: 300; 30 TABLET ORAL at 21:49

## 2020-09-04 RX ADMIN — DOCUSATE SODIUM SCH MG: 100 CAPSULE, LIQUID FILLED ORAL at 18:58

## 2020-09-04 RX ADMIN — IBUPROFEN SCH MG: 800 TABLET, FILM COATED ORAL at 17:34

## 2020-09-04 RX ADMIN — IBUPROFEN SCH MG: 800 TABLET, FILM COATED ORAL at 23:48

## 2020-09-04 RX ADMIN — FAMOTIDINE SCH MG: 20 TABLET, FILM COATED ORAL at 21:49

## 2020-09-04 NOTE — L&D PROGRESS NOTES
=================================================================

PROGRESS NOTES

=================================================================

Datetime Report Generated by CPN: 09/04/2020 15:26

   

   

=================================================================

PROGRESS NOTE

=================================================================

   

Impression:  Reassuring Fetal Heart Rate

Plan:  Continue Present Management; Induction; Anticipate Vaginal

   Delivery

Vital Signs :  Reviewed; Within Normal Limits

Comment:  Attempted pushing x 15 min. Direct OP, head remains behind

   pts pubic bone. Will have pt roll way over to her left side to

   encourage rotation.  Dr Hodges aware.

   

=================================================================

LAST VAGINAL EXAM-NURSING

=================================================================

   

Nursing Exam Dilitation:  10.0

Nursing Exam Effacement:  100

Nursing Exam Station:  1

   

=================================================================

MEMBRANES

=================================================================

   

Membranes:  Ruptured

Amniotic Fluid Color:  Meconium, Light

   

=================================================================

FETUS A

=================================================================

   

FHR - Baseline:  120

Monitoring:  External US

Variability:  Moderate 6-25bpm

Accelerations:  15X15

Decelerations:  Early; Variable

   

=================================================================

SIGNATURE

=================================================================

   

SIGNATURE:  [id number]

Assignment:  Molly Hodges MD

Signature:  Electronically signed by Ivette Stanley CNM on 9/4/2020

   at 15:25  with User ID: [email or web address]

:  Electronically signed by Ivette Stanley CNM on 9/4/2020 at 15:25 

   with User ID: [email or web address]

## 2020-09-04 NOTE — ADMISSION PHYSICAL
=================================================================



=================================================================

Datetime Report Generated by CPN: 2020 09:31

   

   

=================================================================

CURRENT ADMISSION

=================================================================

   

Indication for Induction:  Post Dates

Admit Impression :  Postterm, Intrauterine Pregnancy

Admit Impression- Other:  +GBS, needs PCN prophylaxis

Admit Plan:  Admit to Unit; Initiate Labor Induction Protocol

   

=================================================================

ALLERGIES

=================================================================

   

Medication Allergies:  No

Medication Allergies:  No Known Allergies (2020)

Latex:  No Latex Allergies

Food Allergies:  Almonds

Environmental Allergies:  None

   

=================================================================

OBSTETRICAL HISTORY

=================================================================

   

EDC:  2020 00:00

:  5

Para:  4

Term:  3

:  1

SAB:  0

IAB:  0

Ectopic:  0

Livin

Cesareans:  0

VBACs:  0

Multiple Births:  0

Gestational Diabetes:  No

Rh Sensitization:  No

Incompetent Cervix:  No

PB:  No

Infertility:  No

ART Treatment:  No

Uterine Anomaly:  No

IUGR:  No

Hx Previous C/S:  No

Macrosomia:  Yes

Hx Loss/Stillborn:  No

PIH:  No

Hx  Death:  No

Placenta Previa/Abruption:  No

Depression/PP Depression:  No

PTL/PROM:  Yes

Post Partum Hemorrhage:  No

Current Pregnancy Procedures:  Ultrasound; NST

Obstetrical History Comments:  G1- , 

   G2- , 

   G3- , 

   G4- 2017,  at 32 weeks, PPROM

   G5- Current, 9lbs?

   

=================================================================

***SEE PRENATAL RECORDS***

=================================================================

   

Alcohol:  No

Marijuana :  No

Cocaine:  No

Other Illicit Drugs:  No

Cigarettes:  Never Smoker. [id number]

   

=================================================================

MEDICAL HISTORY

=================================================================

   

Diabetes:  No

Blood Transfusion:  No

Pulmonary Disease (Asthma, TB):  No

Breast Disease:  No

Hypertension:  No

Gyn Surgery:  No

Heart Disease:  No

Hosp/Surgery:  Yes

Autoimmune Disorder:  No

Anesthetic Complications:  No

Kidney Disease:  No

Abnormal Pap Smear:  No

Neuro/Epilepsy:  No

Psychiatric Disorders:  No

Other Medical Diseases:  No

Hepatitis/Liver Disease:  No

Significant Family History:  No

Varicosities/Phlebitis:  No

Trauma/Violence :  No

Thyroid Dysfunction:  No

Medical History Comments:  Childbirth

   

=================================================================

INFECTIOUS HISTORY

=================================================================

   

Gonorrhea:  No

Genital Herpes:  No

Chlamydia:  Yes

Tuberculosis:  No

Syphilis:  No

Hepatitis:  No

HIV/AIDS Exposure:  No

Rash or Viral Illness:  No

HPV:  No

Infectious History Comments:  Chlamydia at age 15 years

   

=================================================================

PHYSICAL EXAM

=================================================================

   

General:  Normal

HEENT:  Normal

Neurologic:  Normal

Thyroid:  Normal

Heart:  Normal

Lungs:  Normal

Breast:  Normal

Back:  Normal

Abdomen:  Normal

Genitourinary Exam:  Normal

Extremities:  Normal

DTRs:  Normal

Pelvic Type:  Adequate

Vital Signs:  Reviewed

   

=================================================================

MEMBRANES

=================================================================

   

Membranes:  Intact

   

=================================================================

FETUS A

=================================================================

   

Monitoring:  External US

FHR- Baseline:  130

Variability:  Moderate 6-25bpm

Accelerations:  15X15

Decelerations:  None

Admit Comment:   presents for IOL at 41 wks. Pt doing well, denies

   SROM or vaginal bleeding, +FM. GBS+. She had u/s in the office the

   other day, EFW 8 lbs. She states her last child also weighed 8 lbs. 

   Vtx on [place]. She is considering an epidural once in active

   labor.  Attending MD today is Dr Hodges

   

=================================================================

PLANS FOR LABOR AND DELIVERY

=================================================================

   

Labor and Delivery:  None

Pain Management:  Natural; Medications

Feeding Preference:  Breast

Benefit of Breast Feed Discussed:  Yes

Circumcision:  Yes

   

=================================================================

INFORMED CONSENT

=================================================================

   

Assignment:  Molly Hodges MD

Signature:  Electronically signed by Ivette Stanley CNM on 2020

   at 09:31  with User ID: Miranda

:  Electronically signed by Ivette Stanley CNM on 2020 at 09:31 

   with User ID: Miranda

## 2020-09-04 NOTE — L&D PROGRESS NOTES
=================================================================

PROGRESS NOTES

=================================================================

Datetime Report Generated by CPN: 09/04/2020 12:00

   

   

=================================================================

PROGRESS NOTE

=================================================================

   

Impression:  Reassuring Fetal Heart Rate

Plan:  Continue Present Management; Induction; Anticipate Vaginal

   Delivery

Vital Signs :  Reviewed; Within Normal Limits

Comment:  Pitocin infusing, VE per RN pt is now 5 cm.  PCN x 1 dose

   infused. Pt is requesting pain medication. Position changes

   encouraged. 

   

=================================================================

LAST VAGINAL EXAM-NURSING

=================================================================

   

Nursing Exam Dilitation:  4.0

Nursing Exam Effacement:  70

Nursing Exam Station:  -1

   

=================================================================

MEMBRANES

=================================================================

   

Membranes:  Intact

   

=================================================================

FETUS A

=================================================================

   

FHR - Baseline:  130

Monitoring:  External US

Variability:  Moderate 6-25bpm

Accelerations:  15X15

Decelerations:  None

   

=================================================================

SIGNATURE

=================================================================

   

SIGNATURE:  [id number];[id number]

Assignment:  Molly Hodges MD

Signature:  Electronically signed by Ivette Stanley CNM on 9/4/2020

   at 11:59  with User ID: [email or web address]

:  Electronically signed by Ivette Stanley CNM on 9/4/2020 at 11:59 

   with User ID: Miranda

## 2020-09-04 NOTE — BIRTH CERTIFICATE DATA
=================================================================

Birth Cert Data

=================================================================

Datetime Report Generated by CPN: 2020 17:52

   

   

=================================================================

BIRTH CERTIFICATE DATA

=================================================================

   

 47a. Prenatal Care:  Yes    (2020 07:58:Maryann Dalton RN)

 47b. Date of First Visit:  2020 00:00    (2020

   07:58:Maryann Dalton RN)

 47c. Date of Last Visit:  2020 00:00    (2020 07:58:Maryann Dalton RN)

 47d. Number of Prenatal Visits:  11    (2020 07:58:Maryann Dalton RN)

 48a. Number of Prev Live Births:  4    (2020 07:58:Maciej Lau RN)

 48b. Now Livin    (2020 07:58:Maciej Lau RN)

 48c. Live Births Now Dead:  0    (2020 07:58:QS system process)

 48e. Pregnancy Losses:  0    (2020 07:58:Maciej Lau RN)

   

=================================================================

RISK FACTORS IN THIS PREGNANCY

=================================================================

   

 49a. Diabetes:  No    (2020 07:58:Maciej Lau RN)

 49b. Hypertension:  No    (2020 07:58:Maciej Lau RN)

 49c. Previous  Births:  1    (2020 07:58:Maciej Lau RN)

 49d. Stillborns:  No    (2020 07:58:Maciej Lau RN)

 49d. IUGR:  No    (2020 07:58:Maciej Lau RN)

 49e. Infertility Treatment:  No    (2020 07:58:Maciej Lau RN)

 49f. Previous Cesareans:  0    (2020 07:58:Maciej Lau RN)

   

=================================================================

Mother's Height

=================================================================

   

 50b. Height Inches:  59    (2020 07:53:QS system process)

   

=================================================================

Mother's Weight 

=================================================================

   

 51a. Pre-Pregnancy Weight (lbs):  194    (2020 07:58:Maryann Dalton RN)

 51b. Weight at Delivery (lbs):  211    (2020 07:53:QS system

   process)

 52. Dt Last Normal Menses Began:  2019 00:00    (2020

   07:58:Maryann Dalton RN)

   

=================================================================

Infections Present/Treated

=================================================================

   

 53a. Gonorrhea:  No    (2020 07:58:Maciej Lau RN)

 Results this Hospital Visit :  Negative    (2020 07:58:Maryann Dalton RN)

 53b. Syphilis:  No    (2020 07:58:Maciej Lau RN)

 53c. Chlamydia:  Yes    (2020 07:58:Maciej Lau RN)

 Results this Hospital Visit:  Negative    (2020 07:58:Maryann Dalton RN)

 53d. Hepatitis B:  No    (2020 07:58:Maciej Lau RN)

 Results this Hospital Visit:  Negative    (2020 07:58:Maciej Lau RN)

 53e. Hepatitis C:  Negative    (2020 07:58:Maryann Dalton RN)

 53h. Mother Tested for HBsAG:  Yes    (2020 07:58:Maryann Dalton RN)

 53i. Date Tested:  2020 00:00    (2020 07:58:Maryann Dalton RN)

 53j. Test Result:  Negative    (2020 07:58:Maciej Lau RN)

   

=================================================================

Obstetric Procedures 

=================================================================

   

 54a, b, c. Obstetric Procedures:  Ultrasound; NST    (2020

   07:58:Maciej Lau RN)

   

=================================================================

Cigarette Smoking 

=================================================================

   

 55a. 3 Months Before Preg - Ci    (2020 07:58:Maciej Lau RN)

   55a. Packs:  0    (2020 07:58:Maciej Lau RN)

 55b. 1st Trimester of Preg- Ci    (2020 07:58:Maciej Lau RN)

   55b. Packs:  0    (2020 07:58:Maciej Lau RN)

 55c. 2nd Trimester of Preg- Ci    (2020 07:58:Maciej Lau RN)

   55c. Packs:  0    (2020 07:58:Maciej Lau RN)

 55d. 3rd Trimester of Preg- Ci    (2020 07:58:Maciej Lau RN)

   55d. Packs:  0    (2020 07:58:Maciej Lau RN)

   

=================================================================

Onset of Labor

=================================================================

   

 56a. PROM >12 Hrs:  2.75    (2020 07:58:QS system process)

 56b. Precipitous Labor <3 Hrs:  3    (2020 07:58:QS system

   process)

 56c. Prolonged Labor > 20 Hrs:  3    (2020 07:58:QS system

   process)

   

=================================================================



=================================================================

   

 57a. Induction of Labor:  Induction    (2020 07:58:Svitlana Perez RN)

 57c. Non-Vertex Presentation A:  Vertex    (2020 07:58:Svitlana Perez RN)

 57d. Steroids - Fetal Lung Mat:  None    (2020 07:58:Svitlana Perez RN)

 57d. Steroids - Fetal Lung Mat:  Not Applicable    (2020

   07:58:Svitlana Perez RN)

 57e. Antibiotics During Labor:  2020 12:59    (2020

   07:58:Svitlana Perez RN)

 57g. Moderate/Heavy Meconium:  Light Meconium    (2020

   13:09:Svitlana Perez RN)

 57h. Fetal Intolerance of Labor:  N/A    (2020 07:58:Svitlana Perez RN)

:  N/A    (2020 07:58:Svitlana Perez RN)

 57i. Epidural/Spinal Anesthesia:  None    (2020 07:58:Svitlana Perez RN)

   

=================================================================

Method of Delivery

=================================================================

   

 58a. Forceps - Unsuccessful A:  N/A    (2020 07:58:Svitlana Perez RN)

 58b. Vacuum - Unsuccessful A:  N/A    (2020 07:58:Svitlana Perez RN)

   

=================================================================

58c. Presentation at Birth

=================================================================

   

 58c. Presentation at Birth - A :  Vertex    (2020 07:58:Svitlana Perez RN)

 58c. Presentation at Birth - A :  N/A    (2020 07:58:Svitlana Perez RN)

 58c. Presentation at Birth - A :  Cephalic    (2020

   07:58:Svitlana Perez RN)

   

=================================================================

Final Route and Method of Del 

=================================================================

   

 58d. Baby A Route/Delivery:  Vaginal    (2020 15:54:Svitlana Perez RN)

 58e. Trial of Labor Attempted:  No    (2020 07:58:Svitlana Perez RN)

 58e. Trial of Labor Attempted A:  N/A    (2020 07:58:Svitlana Perez RN)

 58e. Trial of Labor Attempted B:  N/A    (2020 07:58:Svitlana Perez RN)

   

=================================================================

Maternal Morbidity

=================================================================

   

 59b. 3rd or 4th Degree Lacs:  Perineal    (2020 07:58:Svitlana Perez RN)

 59b. 3rd or 4th Degree Lacs:  Second Degree    (2020

   07:58:Svitlana Perez RN)

   

=================================================================



=================================================================

   

 Birthweight Baby A:  4093    (2020 07:58:Lu Ascencio, RN)

 60a. Pounds :  9    (2020 07:58:QS system process)

 60b. Ounces:  0    (2020 07:58:QS system process)

   

=================================================================

61. GA at Delivery 

=================================================================

   

 Baby A:  41.1    (2020 07:58:Svitlana Perez RN)

:  Late Term-  41- 41.6 Weeks    (2020 07:58:QS system process)

   

=================================================================

62a. APGAR 5 Minute

=================================================================

   

 Baby A:  8    (2020 07:58:QS system process)

## 2020-09-04 NOTE — DELIVERY SUMMARY
=================================================================

Del Sum A-C

=================================================================

Datetime Report Generated by CPN: 2020 17:52

   

   

=================================================================

DELIVERY PERSONNEL

=================================================================

   

DELIVERY PERSONNEL:  [id number]

Nurse Midwife Certified::  Ivette Stanley CNM

Labor and Delivery Nurse::  Svitlana Perez RN

Labor and Delivery Nurse::  Maryann Dalton RN

Nursery Nurse::  Lu Ascencio RN

Scrub Tech/CNA:  Olinda Zarate, ST

   

=================================================================

MATERNAL INFORMATION

=================================================================

   

Delivery Anesthesia:  None

Medications After Delivery:  Pitocin 30 Units in 500ml NS/D5W

Delivery QBL:  300

Maternal Complications:  None

Provider Comments:   of VMI, delivered RAÚL, Bulb suctioned the mouth

   and placed on pts abdoman crying. NBN present for delivery.  Cord

   clamped and cut after one minute. Cord blood obtained. Placenta

   S/C/I, IV pitocin infusing. FF w/ decreased lochia.  Placenta to

   path. 

   small 2nd degree repair done. Baby taken to NBN for evaluation.

   Apgars 8,8.  .  Pt plans to breast feed.  Attending MD is Dr Hodges

   

=================================================================

LABOR SUMMARY

=================================================================

   

EDC:  2020 00:00

No. Babies in Womb:  1

 Attempted:  No

Labor Anesthesia:  None

   

=================================================================

LABOR INFORMATION

=================================================================

   

Reason for Induction:  Postterm

Onset of Labor:  2020 13:00

Complete Dilatation:  2020 14:51

Oxytocin:  Induction

Group B Beta Strep:  Positive

Antibiotics # of Doses:  2

Antibiotics Time of Last Dose:  2020 12:59

Name of Antibiotic Given:  Penicillin

Steroids Given:  None

Reason Steroids Not Administered:  Not Applicable

   

=================================================================

MEMBRANES

=================================================================

   

Membranes Rupture Method:  Artificial

Rupture of Membranes:  2020 13:09

Length of Rupture (hr):  2.75

Amniotic Fluid Color:  Light Meconium

Amniotic Fluid Amount:  Moderate

Amniotic Fluid Odor:  None

   

=================================================================

STAGES OF LABOR

=================================================================

   

Stage 1 hr:  1

Stage 1 min:  51

Stage 2 hr:  1

Stage 2 min:  3

Stage 3 hr:  0

Stage 3 min:  6

Total Time in Labor hr:  3

Total Time in Labor min:  0

   

=================================================================

VAGINAL DELIVERY

=================================================================

   

Episiotomy:  None

Laceration #1:  Perineal

Laceration Extension #1:  Second Degree

Laceration Repair Note:  Repair done w/ 3.0 vicryl on CT needle. 1%

   lidocaine to numb area. Pt tolerated well

   

=================================================================

CSECTION DELIVERY

=================================================================

   

Primary Indication:  N/A

Secondary Indication:  N/A

CSection Incidence:  N/A

Labor:  N/A

Elective:  N/A

CSection Incision:  N/A

   

=================================================================

BABY A INFORMATION

=================================================================

   

Infant Delivery Date/Time:  2020 15:54

Method of Delivery:  Vaginal

Born in Route :  No

:  N/A

Forceps:  N/A

Vacuum Extraction:  N/A

Shoulder Dystocia :  No

   

=================================================================

PRESENTATION/POSITION BABY A

=================================================================

   

Presentation:  Cephalic

Cephalic Presentation:  Vertex

Vertex Position:  Left Occipital Anterior

Breech Presentation:  N/A

   

=================================================================

PLACENTA INFORMATION BABY A

=================================================================

   

Placenta Delivery Time :  2020 16:00

Placenta Method of Delivery:  Spontaneous

Placenta Status:  Delivered

   

=================================================================

APGAR SCORES BABY A

=================================================================

   

Heart Rate 1 min:  >100 bpm

Resp Effort 1 min:  Good Cry

Reflex Irritability 1 min:  Cough or Sneeze or Pulls Away

Muscle Tone 1 min:  Active Motion

Color 1 min:  Blue/Pale

Resuscitation Effort 1 min:  Tactile Stimulation

APGAR SCORE 1 MIN:  8

Heart Rate 5 min:  >100 bpm

Resp Effort 5 min:  Good Cry

Reflex Irritability 5 min:  Cough or Sneeze or Pulls Away

Muscle Tone 5 min:  Active Motion

Color 5 min:  Blue/Pale

Resuscitation Effort 5 min:  Tactile Stimulation; Oxygen

APGAR SCORE 5 MIN:  8

   

=================================================================

INFANT INFORMATION BABY A

=================================================================

   

Gestational Age at Delivery:  41.1

Gestational Status:  Late Term-  41- 41.6 Weeks

Infant Outcome :  Liveborn

Infant Condition :  Stable

Infant Sex:  Male

   

=================================================================

IDENTIFICATION BABY A

=================================================================

   

Infant Verification Date/Time:  2020 16:36

ID Band Number:  [id number]

Mother's Name Verified:  Yes

Infant Medical Record Number:  [id number]

RN Verifying Infant:  rGrover,RN  MMobley,RN

   

=================================================================

WEIGHT/LENGTH BABY A

=================================================================

   

Infant Birthweight (gm):  4093

Infant Weight (lb):  9

Infant Weight (oz):  0

Infant Length (in):  20.25

Infant Length (cm):  51.44

   

=================================================================

CORD INFORMATION BABY A

=================================================================

   

No. Cord Vessels:  2

Nuchal Cord :  N/A

Cord Blood Taken:  Yes-For Eval (Mom's Blood Type - or O+)

Infant Suction:  Mouth

   

=================================================================

ASSESSMENT BABY A

=================================================================

   

Infant Complications:  Meconium

Skin to Skin:  No

Neonatologist/ALS Called :  No

Infant Care By:  rGrover,rn

Transferred To:   Nursery

   

=================================================================

BABY B INFORMATION

=================================================================

   

 :  N/A

   

=================================================================

SIGNATURES

=================================================================

   

Assignment:  Molly Hodges MD

Signature:  Electronically signed by Ivette Stanley CNM on 2020

   at 16:25  with User ID: Miranda

:  Electronically signed by Ivette Stanley CNM on 2020 at 16:25 

   with User ID: Miranda

## 2020-09-05 LAB
ERYTHROCYTE [DISTWIDTH] IN BLOOD BY AUTOMATED COUNT: 16.1 % (ref 11.5–14)
HCT VFR BLD CALC: 27.7 % (ref 36–47)
HGB BLD-MCNC: 9.4 G/DL (ref 12–15.5)
MCH RBC QN AUTO: 25.2 PG (ref 27–33.4)
MCHC RBC AUTO-ENTMCNC: 33.8 G/DL (ref 32–36)
MCV RBC AUTO: 75 FL (ref 80–97)
PLATELET # BLD: 268 10^3/UL (ref 150–450)
RBC # BLD AUTO: 3.71 10^6/UL (ref 3.72–5.28)
WBC # BLD AUTO: 10.3 10^3/UL (ref 4–10.5)

## 2020-09-05 RX ADMIN — FERROUS SULFATE TAB 325 MG (65 MG ELEMENTAL FE) SCH MG: 325 (65 FE) TAB at 17:21

## 2020-09-05 RX ADMIN — FAMOTIDINE SCH MG: 20 TABLET, FILM COATED ORAL at 10:34

## 2020-09-05 RX ADMIN — FAMOTIDINE SCH MG: 20 TABLET, FILM COATED ORAL at 21:11

## 2020-09-05 RX ADMIN — IBUPROFEN SCH MG: 800 TABLET, FILM COATED ORAL at 21:11

## 2020-09-05 RX ADMIN — Medication SCH CAP: at 10:34

## 2020-09-05 RX ADMIN — DOCUSATE SODIUM SCH MG: 100 CAPSULE, LIQUID FILLED ORAL at 17:21

## 2020-09-05 RX ADMIN — IBUPROFEN SCH MG: 800 TABLET, FILM COATED ORAL at 14:41

## 2020-09-05 RX ADMIN — FERROUS SULFATE TAB 325 MG (65 MG ELEMENTAL FE) SCH MG: 325 (65 FE) TAB at 10:34

## 2020-09-05 RX ADMIN — IBUPROFEN SCH MG: 800 TABLET, FILM COATED ORAL at 05:15

## 2020-09-05 RX ADMIN — SENNOSIDES, DOCUSATE SODIUM SCH EACH: 50; 8.6 TABLET, FILM COATED ORAL at 10:34

## 2020-09-05 RX ADMIN — DOCUSATE SODIUM SCH MG: 100 CAPSULE, LIQUID FILLED ORAL at 10:34

## 2020-09-06 VITALS — SYSTOLIC BLOOD PRESSURE: 136 MMHG | DIASTOLIC BLOOD PRESSURE: 68 MMHG

## 2020-09-06 RX ADMIN — DOCUSATE SODIUM SCH MG: 100 CAPSULE, LIQUID FILLED ORAL at 09:06

## 2020-09-06 RX ADMIN — FAMOTIDINE SCH MG: 20 TABLET, FILM COATED ORAL at 09:06

## 2020-09-06 RX ADMIN — IBUPROFEN SCH MG: 800 TABLET, FILM COATED ORAL at 14:00

## 2020-09-06 RX ADMIN — SENNOSIDES, DOCUSATE SODIUM SCH EACH: 50; 8.6 TABLET, FILM COATED ORAL at 09:06

## 2020-09-06 RX ADMIN — FERROUS SULFATE TAB 325 MG (65 MG ELEMENTAL FE) SCH MG: 325 (65 FE) TAB at 09:06

## 2020-09-06 RX ADMIN — Medication SCH CAP: at 09:06

## 2020-09-06 RX ADMIN — IBUPROFEN SCH MG: 800 TABLET, FILM COATED ORAL at 05:31

## 2020-09-06 NOTE — PDOC DISCHARGE SUMMARY
Impression





- Admit/DC Date/PCP


Admission Date/Primary Care Provider: 


  09/04/20 07:30





  ALLISON BECERRA MD





Discharge Date: 09/06/20 - PP Day #2, doing well, O+, rubella immune, 

breastfeeding





- Discharge Diagnosis


(1) Carrier of group B Streptococcus


Is this a current diagnosis for this admission?: Yes   





(2) Meconium in amniotic fluid


Is this a current diagnosis for this admission?: Yes   





(3) Post-dates pregnancy


Is this a current diagnosis for this admission?: Yes   





(4) Precipitate labor, delivered, current hospitalization


Is this a current diagnosis for this admission?: Yes   





- Additional Information


Resuscitation Status: Full Code


Discharge Diet: As Tolerated, Regular


Discharge Activity: Activity As Tolerated, No Lifting Over 10 Pounds, Pelvic 

Rest


Referrals: 


ALLISON BECERRA MD [Primary Care Provider] - 


Prescriptions: 


Ferrous Sulfate [Feosol 325 mg Tablet] 325 mg PO DAILY #30 tablet


Ibuprofen [Motrin 800 mg Tablet] 800 mg PO Q8 #60 tablet


Home Medications: 








Prenatal Vit No.130/Iron/Folic [Prenatal Tablet] 1 each PO DAILY 09/04/20 


Ferrous Sulfate [Feosol 325 mg Tablet] 325 mg PO DAILY #30 tablet 09/06/20 


Ibuprofen [Motrin 800 mg Tablet] 800 mg PO Q8 #60 tablet 09/06/20 











HPI


Reason(s) for Admission: Induction of Labor


Prenatal Procedures: Ultrasound


Intrapartum Procedure(s): Spontaneous Vaginal Delivery


Postpartum Complication(s): Laceration-Perineal


Laceration-Degree: 2nd





Hospital Course


59. Maternal Morbidity (serious complications experinced by the mother 

associated with labor and delivery: None of the above





Results


Laboratory Results: 


                                        











WBC  10.3 10^3/uL (4.0-10.5)   09/05/20  07:10    


 


RBC  3.71 10^6/uL (3.72-5.28)  L  09/05/20  07:10    


 


Hgb  9.4 g/dL (12.0-15.5)  L  09/05/20  07:10    


 


Hct  27.7 % (36.0-47.0)  L  09/05/20  07:10    


 


MCV  75 fl (80-97)  L  09/05/20  07:10    


 


MCH  25.2 pg (27.0-33.4)  L  09/05/20  07:10    


 


MCHC  33.8 g/dL (32.0-36.0)   09/05/20  07:10    


 


RDW  16.1 % (11.5-14.0)  H  09/05/20  07:10    


 


Plt Count  268 10^3/uL (150-450)   09/05/20  07:10    


 


Lymph % (Auto)  33.3 % (13-45)   09/04/20  08:55    


 


Mono % (Auto)  9.3 % (3-13)   09/04/20  08:55    


 


Eos % (Auto)  1.5 % (0-6)   09/04/20  08:55    


 


Baso % (Auto)  0.8 % (0-2)   09/04/20  08:55    


 


Absolute Neuts (auto)  3.2 10^3/uL (1.7-8.2)   09/04/20  08:55    


 


Absolute Lymphs (auto)  1.9 10^3/uL (0.5-4.7)   09/04/20  08:55    


 


Absolute Monos (auto)  0.5 10^3/uL (0.1-1.4)   09/04/20  08:55    


 


Absolute Eos (auto)  0.1 10^3/uL (0.0-0.6)   09/04/20  08:55    


 


Absolute Basos (auto)  0.0 10^3/uL (0.0-0.2)   09/04/20  08:55    


 


Seg Neutrophils %  55.1 % (42-78)   09/04/20  08:55    


 


Urine Color  YELLOW   09/04/20  09:08    


 


Urine Appearance  SLIGHTLY-CLOUDY   09/04/20  09:08    


 


Urine pH  5.0  (5.0-9.0)   09/04/20  09:08    


 


Ur Specific Gravity  1.018   09/04/20  09:08    


 


Urine Protein  30 mg/dL (NEGATIVE)  H  09/04/20  09:08    


 


Urine Glucose (UA)  NEGATIVE mg/dL (NEGATIVE)   09/04/20  09:08    


 


Urine Ketones  TRACE mg/dL (NEGATIVE)  H  09/04/20  09:08    


 


Urine Blood  NEGATIVE  (NEGATIVE)   09/04/20  09:08    


 


Urine Nitrite  NEGATIVE  (NEGATIVE)   09/04/20  09:08    


 


Urine Bilirubin  NEGATIVE  (NEGATIVE)   09/04/20  09:08    


 


Urine Urobilinogen  2.0 mg/dL (<2.0)  H  09/04/20  09:08    


 


Ur Leukocyte Esterase  NEGATIVE  (NEGATIVE)   09/04/20  09:08    


 


Urine WBC (Auto)  11 /HPF  09/04/20  09:08    


 


Urine RBC (Auto)  2 /HPF  09/04/20  09:08    


 


U Hyaline Cast (Auto)  1 /LPF  09/04/20  09:08    


 


Urine Bacteria (Auto)  TRACE /HPF  09/04/20  09:08    


 


Squamous Epi Cells Auto  16 /HPF  09/04/20  09:08    


 


Urine Mucus (Auto)  MANY /LPF  09/04/20  09:08    


 


Urine Ascorbic Acid  NEGATIVE  (NEGATIVE)   09/04/20  09:08    


 


Urine Opiates Screen  NEGATIVE   09/04/20  09:08    


 


Urine Methadone Screen  NEGATIVE   09/04/20  09:08    


 


Ur Barbiturates Screen  NEGATIVE   09/04/20  09:08    


 


Ur Phencyclidine Scrn  NEGATIVE   09/04/20  09:08    


 


Ur Amphetamines Screen  NEGATIVE   09/04/20  09:08    


 


U Benzodiazepines Scrn  NEGATIVE   09/04/20  09:08    


 


Urine Cocaine Screen  NEGATIVE   09/04/20  09:08    


 


U Marijuana (THC) Screen  NEGATIVE   09/04/20  09:08    


 


RPR  NONREACTIVE  (NONREACTIVE)   09/04/20  08:55    


 


Blood Type  O POSITIVE   09/04/20  08:55    


 


Antibody Screen  NEGATIVE   09/04/20  08:55    














Plan


Plan of Treatment: 


d/c home. F/up in 4 wks for PP  check


Time Spent: Less than 30 Minutes

## 2020-11-06 LAB
APPEARANCE UR: (no result)
APTT PPP: YELLOW S
BILIRUB UR QL STRIP: NEGATIVE
ERYTHROCYTE [DISTWIDTH] IN BLOOD BY AUTOMATED COUNT: 20.4 % (ref 11.5–14)
GLUCOSE UR STRIP-MCNC: NEGATIVE MG/DL
HCT VFR BLD CALC: 33.8 % (ref 36–47)
HGB BLD-MCNC: 11.4 G/DL (ref 12–15.5)
KETONES UR STRIP-MCNC: (no result) MG/DL
MCH RBC QN AUTO: 26.4 PG (ref 27–33.4)
MCHC RBC AUTO-ENTMCNC: 33.7 G/DL (ref 32–36)
MCV RBC AUTO: 78 FL (ref 80–97)
NITRITE UR QL STRIP: NEGATIVE
PH UR STRIP: 5 [PH] (ref 5–9)
PLATELET # BLD: 376 10^3/UL (ref 150–450)
PROT UR STRIP-MCNC: 30 MG/DL
RBC # BLD AUTO: 4.32 10^6/UL (ref 3.72–5.28)
SP GR UR STRIP: 1.03
UROBILINOGEN UR-MCNC: 2 MG/DL (ref ?–2)
WBC # BLD AUTO: 5.7 10^3/UL (ref 4–10.5)

## 2020-11-09 ENCOUNTER — HOSPITAL ENCOUNTER (OUTPATIENT)
Dept: HOSPITAL 62 - OROUT | Age: 25
End: 2020-11-09
Attending: OBSTETRICS & GYNECOLOGY
Payer: MEDICAID

## 2020-11-09 VITALS — SYSTOLIC BLOOD PRESSURE: 108 MMHG | DIASTOLIC BLOOD PRESSURE: 67 MMHG

## 2020-11-09 DIAGNOSIS — Z30.2: Primary | ICD-10-CM

## 2020-11-09 DIAGNOSIS — Z03.818: ICD-10-CM

## 2020-11-09 PROCEDURE — 85027 COMPLETE CBC AUTOMATED: CPT

## 2020-11-09 PROCEDURE — 36415 COLL VENOUS BLD VENIPUNCTURE: CPT

## 2020-11-09 PROCEDURE — 81005 URINALYSIS: CPT

## 2020-11-09 PROCEDURE — C9803 HOPD COVID-19 SPEC COLLECT: HCPCS

## 2020-11-09 PROCEDURE — 87635 SARS-COV-2 COVID-19 AMP PRB: CPT

## 2020-11-09 PROCEDURE — 81025 URINE PREGNANCY TEST: CPT

## 2020-11-09 PROCEDURE — 58671 LAPAROSCOPY TUBAL BLOCK: CPT

## 2020-11-09 PROCEDURE — 00851 ANES IPER PX TUBAL LIGATION: CPT

## 2020-11-09 NOTE — OPERATIVE REPORT
Operative Report


DATE OF SURGERY: 11/09/20


PREOPERATIVE DIAGNOSIS: Unwanted fertiltiy.  Multigravida


POSTOPERATIVE DIAGNOSIS: same as above


OPERATION: Laparoscopic bilateral tubal ligation


SURGEON: WANDA CORCORAN


ANESTHESIA: GA


TISSUE REMOVED OR ALTERED: None


COMPLICATIONS: 





None


ESTIMATED BLOOD LOSS: 10 cc


INTRAOPERATIVE FINDINGS: Normal appearing uterus, bilateral fallopian tube and 

ovaries. LIver edge seen and appears normal


PROCEDURE: 





IV fluids: per anesthesia record 





Urinary output: Bladder emptied prior to the procedure





Findings: Normal-appearing uterus bilateral fallopian tubes and ovaries. Liver 

edge seen and appears normal





Position: To recovery room in stable condition





Description of procedure:


The patient was taken to the operating room and general anesthesia was 

administered and found to be adequate. She was then placed on the OR table in 

the dorsal lithotomy position. The Patient was prepped and draped in usual 

sterile fashion.  Timeout was taken. 


A Hood retractor was used as well as a weighted speculum to visualize the 

cervix.  The anterior lip of the cervix was then grasped with a single tooth 

tenaculum and a Dataslide uterine manipulator was placed.





At this time attention was turned to the patient's abdomen and sterile gloves 

were donned a 1 cm infra umbilical incision was made horizontally and carried 

down to the level of the rectus fascia.  The rectus fascia was then grasped with

2 Kocher clamps elevated and incised with Liu scissors.  A digital sweep was 

done noting entry into the peritoneum.  The fascia was tagged bilaterally with 

0-vicryl suture. A #10 Cooney trocar was positioned and CO2 gas was used to 

insufflate the abdomen to a quantity sufficient for the laparoscopy.  The 

laparoscope was inserted and a survey was done of the abdomen pictures were 

obtained. Findings noted normal anatomy. The bowel was difficult to move out of 

the way on the left to get clear access to the left fallopian tube. Small amount

of scar tissue between left side wall and descending large intestines. A second 

8 mm port was placed in the midline approximately 4 cm above the pubic symphysis

under direct visualization. From this angle a good view of the left fallopian 

tube was seen gently moving the bowel.  The left fallopian tube was identified 

and traced to its fimbriated end.  The left ovary was noted to be normal.  A 

Filshie clip was then placed approximately 1 to 2 cm from the uterine cornu 

across the left  fallopian tube. The Filshie clip was noted to surround the tube

in its entirety good blanching and hemostasis was noted.  The right fallopian 

tube was then traced to its fibriated end and a Filshie clip was placed 1 to 2 

cm from the uterine cornu on the right fallopian tube.  The Filshie clip was 

noted to surround the tube in its entirety with good blanching and hemostasis 

was noted.   Pictures were obtained. At this point the procedure was terminated.

All instrument removed from the patient's abdomen and CO2 gas was allowed to 

escape.  The infraumbilical port was removed. The fascia was closed with 0 

Vicryl suture. The skin was closed with 3-0 Monocryl in a series of interrupted 

stitiches. The skin incision was then clean dried and Dermabond was applied over

the skin incision.





All instrument sponge and needle counts were correct x3 for the procedure the 

patient tolerated the procedure well. She will proceed to recovery room in 

stable condition